# Patient Record
Sex: MALE | Race: WHITE | NOT HISPANIC OR LATINO | Employment: FULL TIME | URBAN - METROPOLITAN AREA
[De-identification: names, ages, dates, MRNs, and addresses within clinical notes are randomized per-mention and may not be internally consistent; named-entity substitution may affect disease eponyms.]

---

## 2018-05-04 DIAGNOSIS — Z01.810 PRE-OPERATIVE CARDIOVASCULAR EXAMINATION: ICD-10-CM

## 2018-05-04 DIAGNOSIS — Z01.812 PRE-OPERATIVE LABORATORY EXAMINATION: ICD-10-CM

## 2018-05-04 LAB
ANION GAP SERPL CALC-SCNC: 6 MMOL/L (ref 0–11.9)
BUN SERPL-MCNC: 8 MG/DL (ref 8–22)
CALCIUM SERPL-MCNC: 9.1 MG/DL (ref 8.5–10.5)
CHLORIDE SERPL-SCNC: 108 MMOL/L (ref 96–112)
CO2 SERPL-SCNC: 26 MMOL/L (ref 20–33)
CREAT SERPL-MCNC: 0.93 MG/DL (ref 0.5–1.4)
EKG IMPRESSION: NORMAL
ERYTHROCYTE [DISTWIDTH] IN BLOOD BY AUTOMATED COUNT: 47.5 FL (ref 35.9–50)
GLUCOSE SERPL-MCNC: 106 MG/DL (ref 65–99)
HCT VFR BLD AUTO: 42.5 % (ref 42–52)
HGB BLD-MCNC: 14.4 G/DL (ref 14–18)
MCH RBC QN AUTO: 31 PG (ref 27–33)
MCHC RBC AUTO-ENTMCNC: 33.9 G/DL (ref 33.7–35.3)
MCV RBC AUTO: 91.6 FL (ref 81.4–97.8)
PLATELET # BLD AUTO: 260 K/UL (ref 164–446)
PMV BLD AUTO: 11.7 FL (ref 9–12.9)
POTASSIUM SERPL-SCNC: 4 MMOL/L (ref 3.6–5.5)
RBC # BLD AUTO: 4.64 M/UL (ref 4.7–6.1)
SODIUM SERPL-SCNC: 140 MMOL/L (ref 135–145)
WBC # BLD AUTO: 6.4 K/UL (ref 4.8–10.8)

## 2018-05-04 PROCEDURE — 36415 COLL VENOUS BLD VENIPUNCTURE: CPT

## 2018-05-04 PROCEDURE — 80048 BASIC METABOLIC PNL TOTAL CA: CPT

## 2018-05-04 PROCEDURE — 85027 COMPLETE CBC AUTOMATED: CPT

## 2018-05-04 PROCEDURE — 93010 ELECTROCARDIOGRAM REPORT: CPT | Performed by: INTERNAL MEDICINE

## 2018-05-04 PROCEDURE — 93005 ELECTROCARDIOGRAM TRACING: CPT

## 2018-05-04 RX ORDER — CHLORAL HYDRATE 500 MG
2000 CAPSULE ORAL DAILY
COMMUNITY
End: 2023-10-16

## 2018-05-04 RX ORDER — LISINOPRIL 20 MG/1
20 TABLET ORAL DAILY
COMMUNITY
End: 2022-12-15 | Stop reason: SDUPTHER

## 2018-05-04 RX ORDER — ASCORBIC ACID 500 MG
500 TABLET ORAL
COMMUNITY

## 2018-05-04 RX ORDER — AMLODIPINE BESYLATE 2.5 MG/1
2.5 TABLET ORAL DAILY
COMMUNITY
End: 2022-12-15 | Stop reason: SDUPTHER

## 2018-05-04 RX ORDER — PNV NO.95/FERROUS FUM/FOLIC AC 28MG-0.8MG
1 TABLET ORAL DAILY
COMMUNITY

## 2018-05-15 ENCOUNTER — HOSPITAL ENCOUNTER (OUTPATIENT)
Facility: MEDICAL CENTER | Age: 53
End: 2018-05-15
Attending: SURGERY | Admitting: SURGERY
Payer: COMMERCIAL

## 2018-05-15 VITALS
TEMPERATURE: 98.7 F | OXYGEN SATURATION: 96 % | DIASTOLIC BLOOD PRESSURE: 82 MMHG | WEIGHT: 167.55 LBS | HEIGHT: 63 IN | RESPIRATION RATE: 16 BRPM | SYSTOLIC BLOOD PRESSURE: 119 MMHG | HEART RATE: 82 BPM | BODY MASS INDEX: 29.69 KG/M2

## 2018-05-15 DIAGNOSIS — G89.18 POSTOPERATIVE PAIN: ICD-10-CM

## 2018-05-15 DIAGNOSIS — K64.8 HEMORRHOID PROLAPSE: Chronic | ICD-10-CM

## 2018-05-15 PROCEDURE — A9270 NON-COVERED ITEM OR SERVICE: HCPCS

## 2018-05-15 PROCEDURE — 160035 HCHG PACU - 1ST 60 MINS PHASE I: Performed by: SURGERY

## 2018-05-15 PROCEDURE — 500128 HCHG BOVIE, NEEDLE TIP 3CM: Performed by: SURGERY

## 2018-05-15 PROCEDURE — 700111 HCHG RX REV CODE 636 W/ 250 OVERRIDE (IP)

## 2018-05-15 PROCEDURE — 160048 HCHG OR STATISTICAL LEVEL 1-5: Performed by: SURGERY

## 2018-05-15 PROCEDURE — 700101 HCHG RX REV CODE 250

## 2018-05-15 PROCEDURE — 160028 HCHG SURGERY MINUTES - 1ST 30 MINS LEVEL 3: Performed by: SURGERY

## 2018-05-15 PROCEDURE — 160002 HCHG RECOVERY MINUTES (STAT): Performed by: SURGERY

## 2018-05-15 PROCEDURE — 160046 HCHG PACU - 1ST 60 MINS PHASE II: Performed by: SURGERY

## 2018-05-15 PROCEDURE — 700102 HCHG RX REV CODE 250 W/ 637 OVERRIDE(OP)

## 2018-05-15 PROCEDURE — 160047 HCHG PACU  - EA ADDL 30 MINS PHASE II: Performed by: SURGERY

## 2018-05-15 PROCEDURE — 501838 HCHG SUTURE GENERAL: Performed by: SURGERY

## 2018-05-15 PROCEDURE — A6403 STERILE GAUZE>16 <= 48 SQ IN: HCPCS | Performed by: SURGERY

## 2018-05-15 PROCEDURE — 160025 RECOVERY II MINUTES (STATS): Performed by: SURGERY

## 2018-05-15 PROCEDURE — 160039 HCHG SURGERY MINUTES - EA ADDL 1 MIN LEVEL 3: Performed by: SURGERY

## 2018-05-15 PROCEDURE — 160009 HCHG ANES TIME/MIN: Performed by: SURGERY

## 2018-05-15 PROCEDURE — 88304 TISSUE EXAM BY PATHOLOGIST: CPT | Mod: 59

## 2018-05-15 RX ORDER — BUPIVACAINE HYDROCHLORIDE AND EPINEPHRINE 5; 5 MG/ML; UG/ML
INJECTION, SOLUTION EPIDURAL; INTRACAUDAL; PERINEURAL
Status: DISCONTINUED | OUTPATIENT
Start: 2018-05-15 | End: 2018-05-15 | Stop reason: HOSPADM

## 2018-05-15 RX ORDER — OXYCODONE HCL 5 MG/5 ML
SOLUTION, ORAL ORAL
Status: COMPLETED
Start: 2018-05-15 | End: 2018-05-15

## 2018-05-15 RX ORDER — LIDOCAINE HYDROCHLORIDE 10 MG/ML
0.5 INJECTION, SOLUTION INFILTRATION; PERINEURAL
Status: DISCONTINUED | OUTPATIENT
Start: 2018-05-15 | End: 2018-05-15 | Stop reason: HOSPADM

## 2018-05-15 RX ORDER — OXYCODONE HYDROCHLORIDE AND ACETAMINOPHEN 5; 325 MG/1; MG/1
1-2 TABLET ORAL EVERY 4 HOURS PRN
Qty: 20 TAB | Refills: 0 | Status: SHIPPED | OUTPATIENT
Start: 2018-05-15 | End: 2018-05-15

## 2018-05-15 RX ORDER — CA/D3/MAG OX/ZINC/COP/MANG/BOR 600 MG-800
1 TABLET,CHEWABLE ORAL DAILY
COMMUNITY

## 2018-05-15 RX ORDER — MIDAZOLAM HYDROCHLORIDE 1 MG/ML
INJECTION INTRAMUSCULAR; INTRAVENOUS
Status: DISCONTINUED
Start: 2018-05-15 | End: 2018-05-15 | Stop reason: HOSPADM

## 2018-05-15 RX ORDER — SODIUM CHLORIDE, SODIUM LACTATE, POTASSIUM CHLORIDE, CALCIUM CHLORIDE 600; 310; 30; 20 MG/100ML; MG/100ML; MG/100ML; MG/100ML
1000 INJECTION, SOLUTION INTRAVENOUS
Status: COMPLETED | OUTPATIENT
Start: 2018-05-15 | End: 2018-05-15

## 2018-05-15 RX ORDER — OXYCODONE HYDROCHLORIDE AND ACETAMINOPHEN 5; 325 MG/1; MG/1
1-2 TABLET ORAL EVERY 4 HOURS PRN
Qty: 20 TAB | Refills: 0 | Status: SHIPPED | OUTPATIENT
Start: 2018-05-15 | End: 2018-05-20

## 2018-05-15 RX ADMIN — OXYCODONE HYDROCHLORIDE 5 MG: 5 SOLUTION ORAL at 09:08

## 2018-05-15 RX ADMIN — SODIUM CHLORIDE, SODIUM LACTATE, POTASSIUM CHLORIDE, CALCIUM CHLORIDE 1000 ML: 600; 310; 30; 20 INJECTION, SOLUTION INTRAVENOUS at 07:10

## 2018-05-15 ASSESSMENT — PAIN SCALES - GENERAL
PAINLEVEL_OUTOF10: 4
PAINLEVEL_OUTOF10: 2
PAINLEVEL_OUTOF10: 2

## 2018-05-15 NOTE — OR SURGEON
Immediate Post OP Note    PreOp Diagnosis:   Grade 4 Hemorrhoids  PostOp Diagnosis:     Procedure(s):  HEMORRHOIDECTOMY - Wound Class: Contaminated    Surgeon(s):  Nino Keane M.D.    Anesthesiologist/Type of Anesthesia:  Anesthesiologist: Darlene Ponce M.D./General    Surgical Staff:  Circulator: Elisabeth Chinchilla R.N.  Scrub Person: Arlyn Santoyo    Specimens removed if any:  Left lateral  Right Anterior    Estimated Blood Loss: 30mL    Findings: Prolapsed internal left lateral hemorrhoid unable to be reduced. Excisional hemorrhoidectomy performed for left lateral and right anterior hemorrhoidal complexes.    Complications: none        5/15/2018 9:01 AM Nino Keane M.D.

## 2018-05-15 NOTE — OR NURSING
Discharged home able to urinate and take po.  Walked to [facility] with rn to  and fill script for home.  verbalzied understanding of dc instructions.

## 2018-05-15 NOTE — DISCHARGE INSTRUCTIONS
ACTIVITY: Rest and take it easy for the first 24 hours.  A responsible adult is recommended to remain with you during that time.  It is normal to feel sleepy.  We encourage you to not do anything that requires balance, judgment or coordination.    MILD FLU-LIKE SYMPTOMS ARE NORMAL. YOU MAY EXPERIENCE GENERALIZED MUSCLE ACHES, THROAT IRRITATION, HEADACHE AND/OR SOME NAUSEA.    FOR 24 HOURS DO NOT:  Drive, operate machinery or run household appliances.  Drink beer or alcoholic beverages.   Make important decisions or sign legal documents.    SPECIAL INSTRUCTIONS: *  1.  Carefully remove bandage on the day after surgery. You may shower. Good hygiene is essential for proper healing.  Wearing soft gauze pads or sanitary napkins in your underwear helps to control fluid drainage, discharge of mucous, and bleeding. Change pads and underwear frequently.   2. There are no specific dietary restrictions associated with this surgery. Avoid foods which make you constipated. Be sure to include wheat bran, fresh fruits and plenty of vegetables in your diet. Drink plenty of water.   3. Avoid straining, especially a few days after surgery, when you may experience swelling that feels like an incompletely passed stool or a hemorrhoid. Milk of magnesia should be taken every day starting day of surgery until your first bowel movement. Additionally, we advise that you use an over-the-counter stool softener, such as Colace. Please call our office if no bowel movement within 72 hours.   4. Moderate exercise is healthy. You may walk as much as you like. You are permitted to go up and down stairs slowly while using a handrail. Resume normal activities after first postoperative clinic visit. Avoid heavy lifting or strenuous activity until that time.   5. You may drive when you are comfortable enough to react and move quickly in an emergency (usually 1-2 weeks after surgery). Long trips over 25 miles are not advised. Do not drive if you are  taking prescription pain medication.   6. Return to work when you feel you are able o work with the restrictions as noted above.   7. For pain, one of the Ibuprofen compounds (Advil, Nuprin, etc.) or Tylenol is suggested. Should these not be effective in managing your discomfort, prescription pain medications should be used sparingly to avoid constipation.    8. Please call the office to report any of the following:   Temperature of 101 or higher.   Signs of infection, possibly including increasing redness, warmth,   tenderness, drainage, odor at incision site   Persistent moderate to severe pain   No bowel movement within 72 hours from OR date.      DIET: To avoid nausea, slowly advance diet as tolerated, avoiding spicy or greasy foods for the first day.  Add more substantial food to your diet according to your physician's instructions.  Babies can be fed formula or breast milk as soon as they are hungry.  INCREASE FLUIDS AND FIBER TO AVOID CONSTIPATION.      FOLLOW-UP APPOINTMENT:  A follow-up appointment should be arranged with your doctor ; call to schedule.    You should CALL YOUR PHYSICIAN if you develop:  Fever greater than 101 degrees F.  Pain not relieved by medication, or persistent nausea or vomiting.  Excessive bleeding (blood soaking through dressing) or unexpected drainage from the wound.  Extreme redness or swelling around the incision site, drainage of pus or foul smelling drainage.  Inability to urinate or empty your bladder within 8 hours.  Problems with breathing or chest pain.    You should call 911 if you develop problems with breathing or chest pain.  If you are unable to contact your doctor or surgical center, you should go to the nearest emergency room or urgent care center.  Physician's telephone #: ***    If any questions arise, call your doctor.  If your doctor is not available, please feel free to call the Surgical Center at {Surgical Dept Numbers:[id number]}.  The Center is open Monday  through Friday from 7AM to 7PM.  You can also call the HEALTH HOTLINE open 24 hours/day, 7 days/week and speak to a nurse at [phone], or toll free at [phone].    A registered nurse may call you a few days after your surgery to see how you are doing after your procedure.    MEDICATIONS: Resume taking daily medication.  Take prescribed pain medication with food.  If no medication is prescribed, you may take non-aspirin pain medication if needed.  PAIN MEDICATION CAN BE VERY CONSTIPATING.  Take a stool softener or laxative such as senokot, pericolace, or milk of magnesia if needed.    Prescription given for *percocet**.  Last pain medication given at *908 am**.    If your physician has prescribed pain medication that includes Acetaminophen (Tylenol), do not take additional Acetaminophen (Tylenol) while taking the prescribed medication.    Depression / Suicide Risk    As you are discharged from this [facility] facility, it is important to learn how to keep safe from harming yourself.    Recognize the warning signs:  · Abrupt changes in personality, positive or negative- including increase in energy   · Giving away possessions  · Change in eating patterns- significant weight changes-  positive or negative  · Change in sleeping patterns- unable to sleep or sleeping all the time   · Unwillingness or inability to communicate  · Depression  · Unusual sadness, discouragement and loneliness  · Talk of wanting to die  · Neglect of personal appearance   · Rebelliousness- reckless behavior  · Withdrawal from people/activities they love  · Confusion- inability to concentrate     If you or a loved one observes any of these behaviors or has concerns about self-harm, here's what you can do:  · Talk about it- your feelings and reasons for harming yourself  · Remove any means that you might use to hurt yourself (examples: pills, rope, extension cords, firearm)  · Get professional help from the community (Mental Health,  Substance Abuse, psychological counseling)  · Do not be alone:Call your Safe Contact- someone whom you trust who will be there for you.  · Call your local CRISIS HOTLINE 317-7633 or 208-041-6460  · Call your local Children's Mobile Crisis Response Team [place] (619) 564-4740 or www.WiFast  · Call the toll free National Suicide Prevention Hotlines   · National Suicide Prevention Lifeline 977-716-TZVL (9311)  · National Hope Line Network 800-SUICIDE (805-7001)

## 2018-05-15 NOTE — OP REPORT
DATE OF OPERATION: 5/15/2018    SURGEON:  Nino Keane MD PhD    PREOPERATIVE DIAGNOSIS:  Rectal bleeding rectal pain prolapsed hemorrhoids  POSTOPERATIVE DIAGNOSIS: Prolapsed  Internal/external bleeding hemorrhoids  SURGERY: exam under anesthesia with excisional hemorrhoidectomy ×2  OPERATIVE FINDINGS:  grade 3/4 internal hemorrhoids with stigmata of recent bleed  INDICATIONS FOR PROCEDURE:  This 52 y.o. Male with a history of symptomatic hemorrhoids refractory to medical management.  He possible risks and complications of treatment were described in detail to the patient including infection, bleeding, trouble urinating and very rarely narrowing in the anal canal.  The patient expresses understanding of the risks and benefits of the proposed procedure and wished to proceed.    PROCEDURE IN BRIEF:    The patient was brought to the operating room placed under general anesthesia with bilateral SCDs in place. The patient was then moved into the prone jackknife position.  a timeout was completed verifying the correct patient and procedure site positioning and availability of equipment prior to the start of surgery.  The buttocks were taped apart and the perineum was prepped and draped in the usual standard sterile fashion.  Half percent Marcaine with epinephrine was injected as a perianal nerve block. The anus was carefully dilated until 3 fingers could be introduced.  An anoscope was introduced and the 3 hemorrhoidal pedicles were identified. Both pedicles were excised and turned in the following fashion.  An elliptical incision extending from the perianal skin to the anorectal ring including both internal and external hemorrhoids and excising a minimum amount of anoderm.  The dilated venous mass was then dissected using Metzenbaum scissors from the underlying sphincter muscle. LigaSure device was then used to divide thevascular pedicle.  Hemostasis was achieved.  All hemostasis the skin and mucosal incisions were  closed with a running lock suture of 2-0 Vicryl on the mucosal aspect and converted to subcuticular once the skin was encountered. The anal canal was then injected with local anesthetic.  A gauze pad was tucked between the gluteal folds. The patient tolerated the procedure well and was extubated and taken to the postanesthesia care unit in stable condition.       ____________________________________     JAY BEATTY MD

## 2022-11-21 ENCOUNTER — TELEPHONE (OUTPATIENT)
Dept: SCHEDULING | Facility: IMAGING CENTER | Age: 57
End: 2022-11-21

## 2022-12-15 ENCOUNTER — OFFICE VISIT (OUTPATIENT)
Dept: MEDICAL GROUP | Facility: PHYSICIAN GROUP | Age: 57
End: 2022-12-15
Payer: COMMERCIAL

## 2022-12-15 VITALS
BODY MASS INDEX: 29.73 KG/M2 | HEART RATE: 73 BPM | HEIGHT: 63 IN | RESPIRATION RATE: 16 BRPM | WEIGHT: 167.8 LBS | OXYGEN SATURATION: 96 % | SYSTOLIC BLOOD PRESSURE: 132 MMHG | DIASTOLIC BLOOD PRESSURE: 70 MMHG | TEMPERATURE: 98.7 F

## 2022-12-15 DIAGNOSIS — Z00.00 HEALTHCARE MAINTENANCE: ICD-10-CM

## 2022-12-15 DIAGNOSIS — R00.2 PALPITATION: ICD-10-CM

## 2022-12-15 DIAGNOSIS — Z11.59 ENCOUNTER FOR HEPATITIS C SCREENING TEST FOR LOW RISK PATIENT: ICD-10-CM

## 2022-12-15 DIAGNOSIS — D64.89 ANEMIA DUE TO OTHER CAUSE, NOT CLASSIFIED: ICD-10-CM

## 2022-12-15 DIAGNOSIS — Z12.11 COLON CANCER SCREENING: ICD-10-CM

## 2022-12-15 DIAGNOSIS — I10 ESSENTIAL HYPERTENSION: ICD-10-CM

## 2022-12-15 DIAGNOSIS — Z87.19 HX OF HEMORRHOIDS: ICD-10-CM

## 2022-12-15 PROBLEM — Z82.0 FHX: ALZHEIMER'S DISEASE: Status: ACTIVE | Noted: 2022-12-15

## 2022-12-15 PROCEDURE — 99204 OFFICE O/P NEW MOD 45 MIN: CPT | Performed by: STUDENT IN AN ORGANIZED HEALTH CARE EDUCATION/TRAINING PROGRAM

## 2022-12-15 RX ORDER — LISINOPRIL 20 MG/1
20 TABLET ORAL DAILY
Qty: 90 TABLET | Refills: 1 | Status: SHIPPED | OUTPATIENT
Start: 2022-12-15 | End: 2023-05-02 | Stop reason: SDUPTHER

## 2022-12-15 RX ORDER — AMLODIPINE BESYLATE 2.5 MG/1
2.5 TABLET ORAL DAILY
Qty: 90 TABLET | Refills: 1 | Status: SHIPPED | OUTPATIENT
Start: 2022-12-15 | End: 2023-05-02 | Stop reason: SDUPTHER

## 2022-12-15 ASSESSMENT — PATIENT HEALTH QUESTIONNAIRE - PHQ9: CLINICAL INTERPRETATION OF PHQ2 SCORE: 0

## 2022-12-15 NOTE — PROGRESS NOTES
Subjective:     CC:  Diagnoses of Essential hypertension, Hx of hemorrhoids, Anemia due to other cause, not classified, Encounter for hepatitis C screening test for low risk patient, Healthcare maintenance, Colon cancer screening, and Palpitation were pertinent to this visit.    HISTORY OF THE PRESENT ILLNESS: Patient is a 57 y.o. male.  This patient is new to me today.  Previously patient of Corey Rivers MD.  This pleasant patient is here today to discuss:    1. Essential hypertension  Amlodipine 2.5 mg daily.  Lisinopril 20 mg daily.  Patient is fully compliant with these medications.  He did not want to have him take a diuretic for maintenance of blood pressure but has not done so in many months.  He does have a blood pressure log going back to September that shows no elevated blood pressure readings.    2. Hx of hemorrhoids  3. Anemia due to other cause, not classified  Patient has a history of hemorrhoids with chronic bleeding that caused anemia.  After surgical resolution of his hemorrhoids his anemia resolved.  He continues to take an iron supplement once per week as he feels that this reduces restless leg type symptoms.  He does take the iron supplement on an empty stomach with a vitamin C supplement.    4. Encounter for hepatitis C screening test for low risk patient  Not at increased risk.    5. Healthcare maintenance  Limited exercise, good diet, he has regular dentistry.    6. Colon cancer screening  Patient is due for colonoscopy.  Previously on a 5-year schedule.    7. Need for vaccination  Patient has his vaccine records and will return these when he comes back for lab review.    8. Palpitation  Several instances of palpitations over the past summer while engaged in physical activity including walking around [facility] and cycling.  He does not have these at rest or during activities of daily living.  No chest pain/pressure.  No concurrent shortness of breath reported    Active Diagnosis:    Patient  "Active Problem List   Diagnosis    Hemorrhoid prolapse    Essential hypertension    Hx of hemorrhoids    Other specified anemias    FHx: Alzheimer's disease    Palpitation      Current Outpatient Medications Ordered in Epic   Medication Sig Dispense Refill    amLODIPine (NORVASC) 2.5 MG Tab Take 1 Tablet by mouth every day. 90 Tablet 1    lisinopril (PRINIVIL) 20 MG Tab Take 1 Tablet by mouth every day. 90 Tablet 1    Probiotic Product (PROBIOTIC ADVANCED) Cap Take 1 Cap by mouth every day.      Omega-3 Fatty Acids (FISH OIL) 1000 MG Cap capsule Take 2,000 mg by mouth every day.      Ferrous Sulfate (IRON) 325 (65 Fe) MG Tab Take 1 Tab by mouth every day.      ascorbic acid (ASCORBIC ACID) 500 MG Tab Take 500 mg by mouth every day. (Patient not taking: Reported on 12/15/2022)       No current [facility]-ordered facility-administered medications on file.     ROS:   Gen: No fevers/chills, no changes in weight  HEENT: No changes in vision/hearing, sore throat.  Pulm: No cough, unexplained SOB.  CV: No chest pain/pressure, no palpitations  GI: No nausea/vomiting, no diarrhea  : No dysuria/nocturia  MSk: No myalgias  Skin: No rash/skin changes  Neuro: No headaches, no numbness/tingling  Heme/Lymph: no easy bruising      Objective:     Exam: /70 (BP Location: Left arm, Patient Position: Sitting, BP Cuff Size: Adult)   Pulse 73   Temp 37.1 °C (98.7 °F) (Temporal)   Resp 16   Ht 1.6 m (5' 3\")   Wt 76.1 kg (167 lb 12.8 oz)   SpO2 96%  Body mass index is 29.72 kg/m².    General: Normal appearing. No distress.  HEENT: Normocephalic. Eyes conjunctiva clear lids without ptosis. Pupils equal and reactive to light accommodation. Ears normal shape and contour.  Right external auditory canal is clear.  Left external auditory canal shows multiple polyps at the 5 and 12 o'clock position.  These are small and do not occlude the tympanic membrane.Tympanic membranes are benign. Nasal mucosa benign, oropharynx is without erythema, " edema or exudates.   Neck: Supple without JVD. Thyroid is not enlarged.  Pulmonary: Clear to ausculation.  Normal effort. No rales, ronchi, or wheezing.  Cardiovascular: Regular rate and rhythm without murmur. Radial pulses are intact and equal bilaterally.  Abdomen: Nondistended   neurologic: Grossly nonfocal.  CN II through XII intact.  Lymph: No cervical or supraclavicular lymph nodes are palpable  Skin: Warm and dry.  No obvious lesions.  Musculoskeletal: Normal gait. No extremity cyanosis, clubbing, or edema.  Psych: Normal mood and affect. Alert and oriented x3. Judgment and insight are normal.    A chaperone was offered to the patient during today's exam. Patient declined chaperone.    Labs:   No recent labs.    Assessment & Plan:   57 y.o. male with the following -    1. Essential hypertension  -Chronic, stable.  Well treated without side effect.  - amLODIPine (NORVASC) 2.5 MG Tab; Take 1 Tablet by mouth every day.  Dispense: 90 Tablet; Refill: 1  - lisinopril (PRINIVIL) 20 MG Tab; Take 1 Tablet by mouth every day.  Dispense: 90 Tablet; Refill: 1    2. Hx of hemorrhoids  -Currently in resolution.    3. Anemia due to other cause, not classified  - Believed to be in resolution.  - CBC WITHOUT DIFFERENTIAL; Future  - IRON/TOTAL IRON BIND; Future  - FERRITIN; Future    4. Encounter for hepatitis C screening test for low risk patient  - HCV Scrn ( [id number] 1xLife); Future    5. Healthcare maintenance  - CBC WITHOUT DIFFERENTIAL; Future  - Comp Metabolic Panel; Future  - Lipid Profile; Future  - VITAMIN D,25 HYDROXY (DEFICIENCY); Future  - IRON/TOTAL IRON BIND; Future  - FERRITIN; Future    6. Colon cancer screening  - Referral to GI for Colonoscopy    7. Need for vaccination  -Patient will provide vaccine records at his next visit for assessment of vaccine needs.    8. Palpitation  -Undiagnosed new problem with uncertain prognosis.  -Patient is currently asymptomatic.  - Cardiac Stress Test Treadmill Only;  Future    Return in about 6 weeks (around 1/26/2023).  For lab follow-up, patient preference and #8 above.    Please note that this dictation was created using voice recognition software. I have made every reasonable attempt to correct obvious errors, but I expect that there are errors of grammar and possibly content that I did not discover before finalizing the note.      Nino Ryder PA-C 12/15/2022

## 2022-12-20 ENCOUNTER — HOSPITAL ENCOUNTER (OUTPATIENT)
Dept: LAB | Facility: MEDICAL CENTER | Age: 57
End: 2022-12-20
Attending: STUDENT IN AN ORGANIZED HEALTH CARE EDUCATION/TRAINING PROGRAM
Payer: COMMERCIAL

## 2022-12-20 DIAGNOSIS — Z11.59 ENCOUNTER FOR HEPATITIS C SCREENING TEST FOR LOW RISK PATIENT: ICD-10-CM

## 2022-12-20 DIAGNOSIS — Z00.00 HEALTHCARE MAINTENANCE: ICD-10-CM

## 2022-12-20 DIAGNOSIS — D64.89 ANEMIA DUE TO OTHER CAUSE, NOT CLASSIFIED: ICD-10-CM

## 2022-12-20 LAB
25(OH)D3 SERPL-MCNC: 23 NG/ML (ref 30–100)
ALBUMIN SERPL BCP-MCNC: 4.1 G/DL (ref 3.2–4.9)
ALBUMIN/GLOB SERPL: 1.5 G/DL
ALP SERPL-CCNC: 69 U/L (ref 30–99)
ALT SERPL-CCNC: 22 U/L (ref 2–50)
ANION GAP SERPL CALC-SCNC: 11 MMOL/L (ref 7–16)
AST SERPL-CCNC: 16 U/L (ref 12–45)
BILIRUB SERPL-MCNC: 0.5 MG/DL (ref 0.1–1.5)
BUN SERPL-MCNC: 13 MG/DL (ref 8–22)
CALCIUM ALBUM COR SERPL-MCNC: 9 MG/DL (ref 8.5–10.5)
CALCIUM SERPL-MCNC: 9.1 MG/DL (ref 8.5–10.5)
CHLORIDE SERPL-SCNC: 105 MMOL/L (ref 96–112)
CHOLEST SERPL-MCNC: 213 MG/DL (ref 100–199)
CO2 SERPL-SCNC: 24 MMOL/L (ref 20–33)
CREAT SERPL-MCNC: 0.9 MG/DL (ref 0.5–1.4)
ERYTHROCYTE [DISTWIDTH] IN BLOOD BY AUTOMATED COUNT: 41.1 FL (ref 35.9–50)
FASTING STATUS PATIENT QL REPORTED: NORMAL
FERRITIN SERPL-MCNC: 313 NG/ML (ref 22–322)
GFR SERPLBLD CREATININE-BSD FMLA CKD-EPI: 99 ML/MIN/1.73 M 2
GLOBULIN SER CALC-MCNC: 2.7 G/DL (ref 1.9–3.5)
GLUCOSE SERPL-MCNC: 116 MG/DL (ref 65–99)
HCT VFR BLD AUTO: 46.3 % (ref 42–52)
HCV AB SER QL: NORMAL
HDLC SERPL-MCNC: 34 MG/DL
HGB BLD-MCNC: 17 G/DL (ref 14–18)
IRON SATN MFR SERPL: 32 % (ref 15–55)
IRON SERPL-MCNC: 86 UG/DL (ref 50–180)
LDLC SERPL CALC-MCNC: 124 MG/DL
MCH RBC QN AUTO: 31.5 PG (ref 27–33)
MCHC RBC AUTO-ENTMCNC: 36.7 G/DL (ref 33.7–35.3)
MCV RBC AUTO: 85.9 FL (ref 81.4–97.8)
PLATELET # BLD AUTO: 227 K/UL (ref 164–446)
PMV BLD AUTO: 11.6 FL (ref 9–12.9)
POTASSIUM SERPL-SCNC: 3.6 MMOL/L (ref 3.6–5.5)
PROT SERPL-MCNC: 6.8 G/DL (ref 6–8.2)
RBC # BLD AUTO: 5.39 M/UL (ref 4.7–6.1)
SODIUM SERPL-SCNC: 140 MMOL/L (ref 135–145)
TIBC SERPL-MCNC: 271 UG/DL (ref 250–450)
TRIGL SERPL-MCNC: 273 MG/DL (ref 0–149)
UIBC SERPL-MCNC: 185 UG/DL (ref 110–370)
WBC # BLD AUTO: 8.5 K/UL (ref 4.8–10.8)

## 2022-12-20 PROCEDURE — 80061 LIPID PANEL: CPT

## 2022-12-20 PROCEDURE — 82728 ASSAY OF FERRITIN: CPT

## 2022-12-20 PROCEDURE — 80053 COMPREHEN METABOLIC PANEL: CPT

## 2022-12-20 PROCEDURE — 36415 COLL VENOUS BLD VENIPUNCTURE: CPT

## 2022-12-20 PROCEDURE — G0472 HEP C SCREEN HIGH RISK/OTHER: HCPCS

## 2022-12-20 PROCEDURE — 83550 IRON BINDING TEST: CPT

## 2022-12-20 PROCEDURE — 85027 COMPLETE CBC AUTOMATED: CPT

## 2022-12-20 PROCEDURE — 83540 ASSAY OF IRON: CPT

## 2022-12-20 PROCEDURE — 82306 VITAMIN D 25 HYDROXY: CPT

## 2023-01-06 ENCOUNTER — OFFICE VISIT (OUTPATIENT)
Dept: MEDICAL GROUP | Facility: PHYSICIAN GROUP | Age: 58
End: 2023-01-06
Payer: COMMERCIAL

## 2023-01-06 VITALS
TEMPERATURE: 97.6 F | OXYGEN SATURATION: 97 % | WEIGHT: 168 LBS | BODY MASS INDEX: 29.77 KG/M2 | HEIGHT: 63 IN | DIASTOLIC BLOOD PRESSURE: 60 MMHG | HEART RATE: 75 BPM | SYSTOLIC BLOOD PRESSURE: 128 MMHG

## 2023-01-06 DIAGNOSIS — Z23 NEED FOR VACCINATION: ICD-10-CM

## 2023-01-06 DIAGNOSIS — R73.03 PREDIABETES: ICD-10-CM

## 2023-01-06 DIAGNOSIS — E78.00 PURE HYPERCHOLESTEROLEMIA: ICD-10-CM

## 2023-01-06 PROCEDURE — 90715 TDAP VACCINE 7 YRS/> IM: CPT | Performed by: STUDENT IN AN ORGANIZED HEALTH CARE EDUCATION/TRAINING PROGRAM

## 2023-01-06 PROCEDURE — 99214 OFFICE O/P EST MOD 30 MIN: CPT | Mod: 25 | Performed by: STUDENT IN AN ORGANIZED HEALTH CARE EDUCATION/TRAINING PROGRAM

## 2023-01-06 PROCEDURE — 90471 IMMUNIZATION ADMIN: CPT | Performed by: STUDENT IN AN ORGANIZED HEALTH CARE EDUCATION/TRAINING PROGRAM

## 2023-01-06 RX ORDER — ROSUVASTATIN CALCIUM 10 MG/1
10 TABLET, COATED ORAL EVERY EVENING
Qty: 30 TABLET | Refills: 11 | Status: SHIPPED | OUTPATIENT
Start: 2023-01-06 | End: 2023-05-02 | Stop reason: SDUPTHER

## 2023-01-06 ASSESSMENT — PATIENT HEALTH QUESTIONNAIRE - PHQ9: CLINICAL INTERPRETATION OF PHQ2 SCORE: 0

## 2023-01-06 ASSESSMENT — FIBROSIS 4 INDEX: FIB4 SCORE: 0.87

## 2023-01-06 NOTE — PROGRESS NOTES
Subjective:     CC:  Diagnoses of Need for vaccination, Pure hypercholesterolemia, and Prediabetes were pertinent to this visit.    HISTORY OF THE PRESENT ILLNESS: Patient is a 57 y.o. male. This pleasant patient is here today to discuss:    1. Need for vaccination  Patient desires his Tdap and shingles vaccine.  He declines his influenza vaccine.    2. Pure hypercholesterolemia  Patient does he did diet with some increased saturated fat, he particularly favors cheese.    3.  Prediabetes  Patient does moderate his carbohydrate intake.  He says that he feels kind of poor if he goes to a very low carbohydrate diet.  He has been unsuccessful in weight loss.  He previously exercised quite vigorously with cycling but has been abstaining from that for several months now.  He is open-minded about getting back into cycling.  He does regularly walk for fairly long distances.    Active Diagnosis:    Patient Active Problem List   Diagnosis    Hemorrhoid prolapse    Essential hypertension    Hx of hemorrhoids    Other specified anemias    FHx: Alzheimer's disease    Palpitation    Pure hypercholesterolemia    Prediabetes      Current Outpatient Medications Ordered in Epic   Medication Sig Dispense Refill    Zoster Vac Recomb Adjuvanted (SHINGRIX) 50 MCG/0.5ML Recon Susp Inject 0.5 mL into the shoulder, thigh, or buttocks one time for 1 dose. 0.5 mL 0    rosuvastatin (CRESTOR) 10 MG Tab Take 1 Tablet by mouth every evening. 30 Tablet 11    amLODIPine (NORVASC) 2.5 MG Tab Take 1 Tablet by mouth every day. 90 Tablet 1    lisinopril (PRINIVIL) 20 MG Tab Take 1 Tablet by mouth every day. 90 Tablet 1    Probiotic Product (PROBIOTIC ADVANCED) Cap Take 1 Cap by mouth every day.      Omega-3 Fatty Acids (FISH OIL) 1000 MG Cap capsule Take 2,000 mg by mouth every day.      Ferrous Sulfate (IRON) 325 (65 Fe) MG Tab Take 1 Tab by mouth every day.      ascorbic acid (ASCORBIC ACID) 500 MG Tab Take 500 mg by mouth every 7 days. Indications:  "with iron       No current Epic-ordered facility-administered medications on file.     ROS:   Gen: No fevers/chills, no changes in weight  See HPI.    Objective:     Exam: /60 (BP Location: Left arm, Patient Position: Sitting, BP Cuff Size: Adult)   Pulse 75   Temp 36.4 °C (97.6 °F) (Temporal)   Ht 1.6 m (5' 3\")   Wt 76.2 kg (168 lb)   SpO2 97%  Body mass index is 29.76 kg/m².    General: Normal appearing. No distress.  neurologic: Grossly nonfocal.    Skin: Warm and dry.  No obvious lesions.  Musculoskeletal: Normal gait. No extremity cyanosis, clubbing, or edema.  Psych: Normal mood and affect. Alert and oriented x3. Judgment and insight are normal.    A chaperone was offered to the patient during today's exam. Patient declined chaperone.    Labs:   12/20/2022:  -CBC, WNL  -CMP showing elevated fasting glucose of 116  -Lipid profile showing total cholesterol 213, triglycerides 273, HDL 34, .  ASCVD score 12.8%.    Assessment & Plan:   57 y.o. male with the following -    1. Need for vaccination  - Zoster Vac Recomb Adjuvanted (SHINGRIX) 50 MCG/0.5ML Recon Susp; Inject 0.5 mL into the shoulder, thigh, or buttocks one time for 1 dose.  Dispense: 0.5 mL; Refill: 0  - Tdap Vaccine =>6YO IM    2. Pure hypercholesterolemia  -Chronic, untreated.  - rosuvastatin (CRESTOR) 10 MG Tab; Take 1 Tablet by mouth every evening.  Dispense: 30 Tablet; Refill: 11  - Lipid Profile; in 6 weeks time.    3.  Prediabetes  -Chronic.  -Patient has been encouraged to get back to regular cycling trying to get at least 30 minutes of aerobic activity each day.    Return in about 6 months (around 7/6/2023).  For follow-up on chronic illnesses.    Please note that this dictation was created using voice recognition software. I have made every reasonable attempt to correct obvious errors, but I expect that there are errors of grammar and possibly content that I did not discover before finalizing the note.      Nino Ryder PA-C " 1/6/2023

## 2023-02-17 ENCOUNTER — HOSPITAL ENCOUNTER (OUTPATIENT)
Dept: LAB | Facility: MEDICAL CENTER | Age: 58
End: 2023-02-17
Attending: STUDENT IN AN ORGANIZED HEALTH CARE EDUCATION/TRAINING PROGRAM
Payer: COMMERCIAL

## 2023-02-17 DIAGNOSIS — E78.00 PURE HYPERCHOLESTEROLEMIA: ICD-10-CM

## 2023-02-17 LAB
CHOLEST SERPL-MCNC: 125 MG/DL (ref 100–199)
FASTING STATUS PATIENT QL REPORTED: NORMAL
HDLC SERPL-MCNC: 39 MG/DL
LDLC SERPL CALC-MCNC: 60 MG/DL
TRIGL SERPL-MCNC: 131 MG/DL (ref 0–149)

## 2023-02-17 PROCEDURE — 80061 LIPID PANEL: CPT

## 2023-02-17 PROCEDURE — 36415 COLL VENOUS BLD VENIPUNCTURE: CPT

## 2023-05-02 ENCOUNTER — TELEPHONE (OUTPATIENT)
Dept: MEDICAL GROUP | Facility: PHYSICIAN GROUP | Age: 58
End: 2023-05-02

## 2023-05-02 DIAGNOSIS — I10 ESSENTIAL HYPERTENSION: ICD-10-CM

## 2023-05-02 DIAGNOSIS — E78.00 PURE HYPERCHOLESTEROLEMIA: ICD-10-CM

## 2023-05-02 RX ORDER — AMLODIPINE BESYLATE 2.5 MG/1
2.5 TABLET ORAL DAILY
Qty: 90 TABLET | Refills: 1 | Status: SHIPPED | OUTPATIENT
Start: 2023-05-02 | End: 2023-11-10 | Stop reason: SDUPTHER

## 2023-05-02 RX ORDER — LISINOPRIL 20 MG/1
20 TABLET ORAL DAILY
Qty: 90 TABLET | Refills: 1 | Status: SHIPPED | OUTPATIENT
Start: 2023-05-02 | End: 2023-11-10 | Stop reason: SDUPTHER

## 2023-05-02 RX ORDER — ROSUVASTATIN CALCIUM 10 MG/1
10 TABLET, COATED ORAL EVERY EVENING
Qty: 90 TABLET | Refills: 1 | Status: SHIPPED | OUTPATIENT
Start: 2023-05-02 | End: 2023-11-10 | Stop reason: SDUPTHER

## 2023-05-02 NOTE — TELEPHONE ENCOUNTER
Received request via: Patient    Was the patient seen in the last year in this department? Yes    Does the patient have an active prescription (recently filled or refills available) for medication(s) requested? No    Does the patient have [facility] and need 100 day supply (blood pressure, diabetes and cholesterol meds only)? Patient does not have SCP      Patient was having issues with insurance and needed to change pharmacies.

## 2023-05-02 NOTE — TELEPHONE ENCOUNTER
Patient came in to the office in regards of changing pharmacy due to insurance conflict.. Patient insurance instructed him to have the provider send refills from now on to ( [facility]). But would still like to leave the [facility] pharmacy on file in case that the provider would need to send a prescription for any urgent reasons. Patient would like to request the provider to refill 3 presciptions with 3months supplies for each ( Rosuvastatin, Amlodipine, Lisinopril) Please send these prescription through [facility]..

## 2023-07-31 ENCOUNTER — OFFICE VISIT (OUTPATIENT)
Dept: MEDICAL GROUP | Facility: PHYSICIAN GROUP | Age: 58
End: 2023-07-31
Payer: COMMERCIAL

## 2023-07-31 VITALS
SYSTOLIC BLOOD PRESSURE: 120 MMHG | BODY MASS INDEX: 29.88 KG/M2 | TEMPERATURE: 97.6 F | OXYGEN SATURATION: 95 % | HEART RATE: 71 BPM | HEIGHT: 63 IN | DIASTOLIC BLOOD PRESSURE: 80 MMHG | WEIGHT: 168.6 LBS

## 2023-07-31 DIAGNOSIS — I10 ESSENTIAL HYPERTENSION: ICD-10-CM

## 2023-07-31 DIAGNOSIS — M54.2 NECK AND SHOULDER PAIN: ICD-10-CM

## 2023-07-31 DIAGNOSIS — M25.519 NECK AND SHOULDER PAIN: ICD-10-CM

## 2023-07-31 DIAGNOSIS — Z23 NEED FOR VACCINATION: ICD-10-CM

## 2023-07-31 DIAGNOSIS — R73.03 PREDIABETES: ICD-10-CM

## 2023-07-31 DIAGNOSIS — E78.00 PURE HYPERCHOLESTEROLEMIA: ICD-10-CM

## 2023-07-31 LAB
HBA1C MFR BLD: 5.2 % (ref ?–5.8)
POCT INT CON NEG: NEGATIVE
POCT INT CON POS: POSITIVE

## 2023-07-31 PROCEDURE — 99214 OFFICE O/P EST MOD 30 MIN: CPT | Performed by: STUDENT IN AN ORGANIZED HEALTH CARE EDUCATION/TRAINING PROGRAM

## 2023-07-31 PROCEDURE — 3079F DIAST BP 80-89 MM HG: CPT | Performed by: STUDENT IN AN ORGANIZED HEALTH CARE EDUCATION/TRAINING PROGRAM

## 2023-07-31 PROCEDURE — 83036 HEMOGLOBIN GLYCOSYLATED A1C: CPT | Performed by: STUDENT IN AN ORGANIZED HEALTH CARE EDUCATION/TRAINING PROGRAM

## 2023-07-31 PROCEDURE — 3074F SYST BP LT 130 MM HG: CPT | Performed by: STUDENT IN AN ORGANIZED HEALTH CARE EDUCATION/TRAINING PROGRAM

## 2023-07-31 RX ORDER — CYCLOBENZAPRINE HCL 10 MG
10 TABLET ORAL NIGHTLY PRN
Qty: 30 TABLET | Refills: 0 | Status: SHIPPED | OUTPATIENT
Start: 2023-07-31 | End: 2023-11-29

## 2023-07-31 ASSESSMENT — FIBROSIS 4 INDEX: FIB4 SCORE: 0.87

## 2023-07-31 NOTE — PROGRESS NOTES
CC:  Diagnoses of Essential hypertension, Pure hypercholesterolemia, Prediabetes, Neck and shoulder pain, and Need for vaccination were pertinent to this visit.    HISTORY OF THE PRESENT ILLNESS: Patient is a 57 y.o. male. This pleasant patient is here today to discuss:    1. Essential hypertension  Lisinopril 20 mg daily.  Amlodipine 2.5 mg daily.  Patient reports full compliance.  No chronic dry cough or globus sensation reported.  No dependent edema.    2. Pure hypercholesterolemia  Rosuvastatin 10 mg daily.  Patient reports full compliance.  Patient reports no myalgias.    3. Prediabetes  Patient has been on and off with his exercise regimen and has been avoiding processed sugars.    4. Neck and shoulder pain  On and off for several weeks, patient has had tension and pain of the neck and upper shoulders.  This can be quite tense at times.  He has tried ibuprofen with only modest relief.  He has not been engaging in a stretching regimen.    5. Need for vaccination  Patient in need of his shingles vaccine 2/2.      Active Diagnosis:    Patient Active Problem List   Diagnosis    Hemorrhoid prolapse    Essential hypertension    Hx of hemorrhoids    Other specified anemias    FHx: Alzheimer's disease    Palpitation    Pure hypercholesterolemia    Prediabetes      Current Outpatient Medications Ordered in Epic   Medication Sig Dispense Refill    Zoster Vac Recomb Adjuvanted (SHINGRIX) 50 MCG/0.5ML Recon Susp Inject 0.5 mL into the shoulder, thigh, or buttocks one time for 1 dose. 0.5 mL 0    cyclobenzaprine (FLEXERIL) 10 mg Tab Take 1 Tablet by mouth at bedtime as needed for Mild Pain. 30 Tablet 0    rosuvastatin (CRESTOR) 10 MG Tab Take 1 Tablet by mouth every evening. 90 Tablet 1    amLODIPine (NORVASC) 2.5 MG Tab Take 1 Tablet by mouth every day. 90 Tablet 1    lisinopril (PRINIVIL) 20 MG Tab Take 1 Tablet by mouth every day. 90 Tablet 1    Probiotic Product (PROBIOTIC ADVANCED) Cap Take 1 Cap by mouth every day.    "   Omega-3 Fatty Acids (FISH OIL) 1000 MG Cap capsule Take 2,000 mg by mouth every day.      Ferrous Sulfate (IRON) 325 (65 Fe) MG Tab Take 1 Tab by mouth every day.      ascorbic acid (ASCORBIC ACID) 500 MG Tab Take 500 mg by mouth every 7 days. Indications: with iron       No current Epic-ordered facility-administered medications on file.     ROS:   See HPI.    Objective:     Exam: /80 (BP Location: Left arm, Patient Position: Sitting, BP Cuff Size: Adult)   Pulse 71   Temp 36.4 °C (97.6 °F) (Temporal)   Ht 1.6 m (5' 3\")   Wt 76.5 kg (168 lb 9.6 oz)   SpO2 95%  Body mass index is 29.87 kg/m².    General: Normal appearing. No distress.  Pulmonary: Clear to ausculation.  Normal effort. No rales, ronchi, or wheezing.  Cardiovascular: Regular rate and rhythm without murmur.   neurologic: Grossly nonfocal.    Skin: Warm and dry.  No obvious lesions.  Musculoskeletal: No extremity cyanosis, clubbing, or edema.  Psych: Normal mood and affect.             Assessment & Plan:   57 y.o. male with the following -    Labs:   7/31/2023:  -POCT hemoglobin A1c 5.2%.    12/20/2022:  -Lipid profile showing total cholesterol 125, triglycerides 131, HDL 39, LDL 60.  ASCVD score 6.4%.  -CMP showing elevated fasting glucose 116.    1. Essential hypertension  -Chronic, stable.  Well-controlled without side effect.  -Lisinopril 20 mg daily.  -Amlodipine 2.5 mg daily.    2. Pure hypercholesterolemia  -Chronic, stable.  -Rosuvastatin 10 mg daily.    3. Prediabetes  -Chronic.  -Patient encouraged to increase his aerobic activity and carefully watch carbohydrate intake of all types.  - POCT  A1C    4. Neck and shoulder pain  -Acute.  -Regular neck stretches have been recommended and demonstrated in office today.  -Okay for ibuprofen 800 mg 3 times daily as needed over the short-term.  Commend he take this with meals.  - cyclobenzaprine (FLEXERIL) 10 mg Tab; Take 1 Tablet by mouth at bedtime as needed for Mild Pain.  Dispense: 30 " Tablet; Refill: 0    5. Need for vaccination  - Zoster Vac Recomb Adjuvanted (SHINGRIX) 50 MCG/0.5ML Recon Susp; Inject 0.5 mL into the shoulder, thigh, or buttocks one time for 1 dose.  Dispense: 0.5 mL; Refill: 0    Return in about 6 months (around 1/31/2024).    Please note that this dictation was created using voice recognition software. I have made every reasonable attempt to correct obvious errors, but I expect that there are errors of grammar and possibly content that I did not discover before finalizing the note.      Nino Ryder PA-C 7/31/2023

## 2023-08-12 SDOH — HEALTH STABILITY: PHYSICAL HEALTH: ON AVERAGE, HOW MANY DAYS PER WEEK DO YOU ENGAGE IN MODERATE TO STRENUOUS EXERCISE (LIKE A BRISK WALK)?: 3 DAYS

## 2023-08-12 SDOH — ECONOMIC STABILITY: TRANSPORTATION INSECURITY
IN THE PAST 12 MONTHS, HAS LACK OF TRANSPORTATION KEPT YOU FROM MEETINGS, WORK, OR FROM GETTING THINGS NEEDED FOR DAILY LIVING?: NO

## 2023-08-12 SDOH — ECONOMIC STABILITY: TRANSPORTATION INSECURITY
IN THE PAST 12 MONTHS, HAS LACK OF RELIABLE TRANSPORTATION KEPT YOU FROM MEDICAL APPOINTMENTS, MEETINGS, WORK OR FROM GETTING THINGS NEEDED FOR DAILY LIVING?: NO

## 2023-08-12 SDOH — HEALTH STABILITY: PHYSICAL HEALTH: ON AVERAGE, HOW MANY MINUTES DO YOU ENGAGE IN EXERCISE AT THIS LEVEL?: 40 MIN

## 2023-08-12 SDOH — ECONOMIC STABILITY: HOUSING INSECURITY
IN THE LAST 12 MONTHS, WAS THERE A TIME WHEN YOU DID NOT HAVE A STEADY PLACE TO SLEEP OR SLEPT IN A SHELTER (INCLUDING NOW)?: NO

## 2023-08-12 SDOH — ECONOMIC STABILITY: TRANSPORTATION INSECURITY
IN THE PAST 12 MONTHS, HAS THE LACK OF TRANSPORTATION KEPT YOU FROM MEDICAL APPOINTMENTS OR FROM GETTING MEDICATIONS?: NO

## 2023-08-12 SDOH — ECONOMIC STABILITY: INCOME INSECURITY: IN THE LAST 12 MONTHS, WAS THERE A TIME WHEN YOU WERE NOT ABLE TO PAY THE MORTGAGE OR RENT ON TIME?: NO

## 2023-08-12 SDOH — ECONOMIC STABILITY: FOOD INSECURITY: WITHIN THE PAST 12 MONTHS, YOU WORRIED THAT YOUR FOOD WOULD RUN OUT BEFORE YOU GOT MONEY TO BUY MORE.: NEVER TRUE

## 2023-08-12 SDOH — ECONOMIC STABILITY: FOOD INSECURITY: WITHIN THE PAST 12 MONTHS, THE FOOD YOU BOUGHT JUST DIDN'T LAST AND YOU DIDN'T HAVE MONEY TO GET MORE.: NEVER TRUE

## 2023-08-12 SDOH — ECONOMIC STABILITY: HOUSING INSECURITY: IN THE LAST 12 MONTHS, HOW MANY PLACES HAVE YOU LIVED?: 1

## 2023-08-12 SDOH — HEALTH STABILITY: MENTAL HEALTH
STRESS IS WHEN SOMEONE FEELS TENSE, NERVOUS, ANXIOUS, OR CAN'T SLEEP AT NIGHT BECAUSE THEIR MIND IS TROUBLED. HOW STRESSED ARE YOU?: TO SOME EXTENT

## 2023-08-12 SDOH — ECONOMIC STABILITY: INCOME INSECURITY: HOW HARD IS IT FOR YOU TO PAY FOR THE VERY BASICS LIKE FOOD, HOUSING, MEDICAL CARE, AND HEATING?: NOT HARD AT ALL

## 2023-08-12 ASSESSMENT — SOCIAL DETERMINANTS OF HEALTH (SDOH)
HOW OFTEN DO YOU GET TOGETHER WITH FRIENDS OR RELATIVES?: NEVER
DO YOU BELONG TO ANY CLUBS OR ORGANIZATIONS SUCH AS CHURCH GROUPS UNIONS, FRATERNAL OR ATHLETIC GROUPS, OR SCHOOL GROUPS?: PATIENT DECLINED
IN A TYPICAL WEEK, HOW MANY TIMES DO YOU TALK ON THE PHONE WITH FAMILY, FRIENDS, OR NEIGHBORS?: MORE THAN THREE TIMES A WEEK
DO YOU BELONG TO ANY CLUBS OR ORGANIZATIONS SUCH AS CHURCH GROUPS UNIONS, FRATERNAL OR ATHLETIC GROUPS, OR SCHOOL GROUPS?: PATIENT DECLINED
HOW OFTEN DO YOU GET TOGETHER WITH FRIENDS OR RELATIVES?: NEVER
HOW OFTEN DO YOU ATTENT MEETINGS OF THE CLUB OR ORGANIZATION YOU BELONG TO?: 1 TO 4 TIMES PER YEAR
HOW OFTEN DO YOU ATTEND CHURCH OR RELIGIOUS SERVICES?: NEVER
HOW MANY DRINKS CONTAINING ALCOHOL DO YOU HAVE ON A TYPICAL DAY WHEN YOU ARE DRINKING: 1 OR 2
HOW OFTEN DO YOU ATTEND CHURCH OR RELIGIOUS SERVICES?: NEVER
WITHIN THE PAST 12 MONTHS, YOU WORRIED THAT YOUR FOOD WOULD RUN OUT BEFORE YOU GOT THE MONEY TO BUY MORE: NEVER TRUE
DO YOU BELONG TO ANY CLUBS OR ORGANIZATIONS SUCH AS CHURCH GROUPS UNIONS, FRATERNAL OR ATHLETIC GROUPS, OR SCHOOL GROUPS?: PATIENT DECLINED
IN A TYPICAL WEEK, HOW MANY TIMES DO YOU TALK ON THE PHONE WITH FAMILY, FRIENDS, OR NEIGHBORS?: MORE THAN THREE TIMES A WEEK
HOW OFTEN DO YOU HAVE A DRINK CONTAINING ALCOHOL: 2-3 TIMES A WEEK
HOW OFTEN DO YOU ATTENT MEETINGS OF THE CLUB OR ORGANIZATION YOU BELONG TO?: 1 TO 4 TIMES PER YEAR
HOW OFTEN DO YOU ATTEND CHURCH OR RELIGIOUS SERVICES?: NEVER
IN A TYPICAL WEEK, HOW MANY TIMES DO YOU TALK ON THE PHONE WITH FAMILY, FRIENDS, OR NEIGHBORS?: MORE THAN THREE TIMES A WEEK
HOW HARD IS IT FOR YOU TO PAY FOR THE VERY BASICS LIKE FOOD, HOUSING, MEDICAL CARE, AND HEATING?: NOT HARD AT ALL
HOW OFTEN DO YOU HAVE SIX OR MORE DRINKS ON ONE OCCASION: NEVER
HOW OFTEN DO YOU ATTENT MEETINGS OF THE CLUB OR ORGANIZATION YOU BELONG TO?: 1 TO 4 TIMES PER YEAR
HOW OFTEN DO YOU GET TOGETHER WITH FRIENDS OR RELATIVES?: NEVER

## 2023-08-12 ASSESSMENT — LIFESTYLE VARIABLES
HOW OFTEN DO YOU HAVE A DRINK CONTAINING ALCOHOL: 2-3 TIMES A WEEK
HOW MANY STANDARD DRINKS CONTAINING ALCOHOL DO YOU HAVE ON A TYPICAL DAY: 1 OR 2
AUDIT-C TOTAL SCORE: 3
HOW OFTEN DO YOU HAVE SIX OR MORE DRINKS ON ONE OCCASION: NEVER
HOW OFTEN DO YOU HAVE SIX OR MORE DRINKS ON ONE OCCASION: NEVER
SKIP TO QUESTIONS 9-10: 1
SKIP TO QUESTIONS 9-10: 1
HOW MANY STANDARD DRINKS CONTAINING ALCOHOL DO YOU HAVE ON A TYPICAL DAY: 1 OR 2
HOW OFTEN DO YOU HAVE A DRINK CONTAINING ALCOHOL: 2-3 TIMES A WEEK
AUDIT-C TOTAL SCORE: 3

## 2023-08-14 ENCOUNTER — APPOINTMENT (OUTPATIENT)
Dept: MEDICAL GROUP | Facility: PHYSICIAN GROUP | Age: 58
End: 2023-08-14
Payer: COMMERCIAL

## 2023-08-15 ENCOUNTER — DOCUMENTATION (OUTPATIENT)
Dept: HEALTH INFORMATION MANAGEMENT | Facility: OTHER | Age: 58
End: 2023-08-15
Payer: COMMERCIAL

## 2023-10-13 SDOH — ECONOMIC STABILITY: HOUSING INSECURITY: IN THE LAST 12 MONTHS, HOW MANY PLACES HAVE YOU LIVED?: 1

## 2023-10-13 SDOH — ECONOMIC STABILITY: INCOME INSECURITY: IN THE LAST 12 MONTHS, WAS THERE A TIME WHEN YOU WERE NOT ABLE TO PAY THE MORTGAGE OR RENT ON TIME?: NO

## 2023-10-13 SDOH — HEALTH STABILITY: PHYSICAL HEALTH: ON AVERAGE, HOW MANY DAYS PER WEEK DO YOU ENGAGE IN MODERATE TO STRENUOUS EXERCISE (LIKE A BRISK WALK)?: 3 DAYS

## 2023-10-13 SDOH — HEALTH STABILITY: PHYSICAL HEALTH: ON AVERAGE, HOW MANY MINUTES DO YOU ENGAGE IN EXERCISE AT THIS LEVEL?: 40 MIN

## 2023-10-13 SDOH — ECONOMIC STABILITY: INCOME INSECURITY: HOW HARD IS IT FOR YOU TO PAY FOR THE VERY BASICS LIKE FOOD, HOUSING, MEDICAL CARE, AND HEATING?: NOT HARD AT ALL

## 2023-10-13 SDOH — ECONOMIC STABILITY: FOOD INSECURITY: WITHIN THE PAST 12 MONTHS, THE FOOD YOU BOUGHT JUST DIDN'T LAST AND YOU DIDN'T HAVE MONEY TO GET MORE.: NEVER TRUE

## 2023-10-13 SDOH — ECONOMIC STABILITY: FOOD INSECURITY: WITHIN THE PAST 12 MONTHS, YOU WORRIED THAT YOUR FOOD WOULD RUN OUT BEFORE YOU GOT MONEY TO BUY MORE.: NEVER TRUE

## 2023-10-13 ASSESSMENT — SOCIAL DETERMINANTS OF HEALTH (SDOH)
HOW OFTEN DO YOU HAVE A DRINK CONTAINING ALCOHOL: 2-3 TIMES A WEEK
HOW OFTEN DO YOU HAVE SIX OR MORE DRINKS ON ONE OCCASION: NEVER
HOW OFTEN DO YOU ATTEND CHURCH OR RELIGIOUS SERVICES?: NEVER
HOW OFTEN DO YOU GET TOGETHER WITH FRIENDS OR RELATIVES?: NEVER
HOW OFTEN DO YOU ATTENT MEETINGS OF THE CLUB OR ORGANIZATION YOU BELONG TO?: 1 TO 4 TIMES PER YEAR
HOW MANY DRINKS CONTAINING ALCOHOL DO YOU HAVE ON A TYPICAL DAY WHEN YOU ARE DRINKING: 1 OR 2
DO YOU BELONG TO ANY CLUBS OR ORGANIZATIONS SUCH AS CHURCH GROUPS UNIONS, FRATERNAL OR ATHLETIC GROUPS, OR SCHOOL GROUPS?: PATIENT DECLINED
HOW HARD IS IT FOR YOU TO PAY FOR THE VERY BASICS LIKE FOOD, HOUSING, MEDICAL CARE, AND HEATING?: NOT HARD AT ALL
HOW OFTEN DO YOU GET TOGETHER WITH FRIENDS OR RELATIVES?: NEVER
IN A TYPICAL WEEK, HOW MANY TIMES DO YOU TALK ON THE PHONE WITH FAMILY, FRIENDS, OR NEIGHBORS?: MORE THAN THREE TIMES A WEEK
DO YOU BELONG TO ANY CLUBS OR ORGANIZATIONS SUCH AS CHURCH GROUPS UNIONS, FRATERNAL OR ATHLETIC GROUPS, OR SCHOOL GROUPS?: PATIENT DECLINED
HOW OFTEN DO YOU ATTENT MEETINGS OF THE CLUB OR ORGANIZATION YOU BELONG TO?: 1 TO 4 TIMES PER YEAR
WITHIN THE PAST 12 MONTHS, YOU WORRIED THAT YOUR FOOD WOULD RUN OUT BEFORE YOU GOT THE MONEY TO BUY MORE: NEVER TRUE
HOW OFTEN DO YOU ATTEND CHURCH OR RELIGIOUS SERVICES?: NEVER
IN A TYPICAL WEEK, HOW MANY TIMES DO YOU TALK ON THE PHONE WITH FAMILY, FRIENDS, OR NEIGHBORS?: MORE THAN THREE TIMES A WEEK

## 2023-10-13 ASSESSMENT — LIFESTYLE VARIABLES
HOW OFTEN DO YOU HAVE A DRINK CONTAINING ALCOHOL: 2-3 TIMES A WEEK
HOW OFTEN DO YOU HAVE SIX OR MORE DRINKS ON ONE OCCASION: NEVER
HOW MANY STANDARD DRINKS CONTAINING ALCOHOL DO YOU HAVE ON A TYPICAL DAY: 1 OR 2
SKIP TO QUESTIONS 9-10: 1
AUDIT-C TOTAL SCORE: 3

## 2023-10-16 ENCOUNTER — OFFICE VISIT (OUTPATIENT)
Dept: MEDICAL GROUP | Facility: PHYSICIAN GROUP | Age: 58
End: 2023-10-16
Payer: COMMERCIAL

## 2023-10-16 VITALS
HEART RATE: 82 BPM | OXYGEN SATURATION: 94 % | WEIGHT: 170 LBS | HEIGHT: 63 IN | BODY MASS INDEX: 30.12 KG/M2 | SYSTOLIC BLOOD PRESSURE: 120 MMHG | TEMPERATURE: 97.8 F | DIASTOLIC BLOOD PRESSURE: 76 MMHG

## 2023-10-16 DIAGNOSIS — D22.9 FLESHY SKIN MOLE: ICD-10-CM

## 2023-10-16 DIAGNOSIS — E78.00 PURE HYPERCHOLESTEROLEMIA: Chronic | ICD-10-CM

## 2023-10-16 DIAGNOSIS — I10 ESSENTIAL HYPERTENSION: Chronic | ICD-10-CM

## 2023-10-16 PROCEDURE — 3074F SYST BP LT 130 MM HG: CPT | Performed by: STUDENT IN AN ORGANIZED HEALTH CARE EDUCATION/TRAINING PROGRAM

## 2023-10-16 PROCEDURE — 3078F DIAST BP <80 MM HG: CPT | Performed by: STUDENT IN AN ORGANIZED HEALTH CARE EDUCATION/TRAINING PROGRAM

## 2023-10-16 PROCEDURE — 99214 OFFICE O/P EST MOD 30 MIN: CPT | Performed by: STUDENT IN AN ORGANIZED HEALTH CARE EDUCATION/TRAINING PROGRAM

## 2023-10-16 ASSESSMENT — ENCOUNTER SYMPTOMS
HEADACHES: 0
CHILLS: 0
PALPITATIONS: 0
SHORTNESS OF BREATH: 0
FEVER: 0
DIZZINESS: 0

## 2023-10-16 ASSESSMENT — FIBROSIS 4 INDEX: FIB4 SCORE: 0.89

## 2023-10-16 NOTE — PROGRESS NOTES
Subjective:     CC:    Chief Complaint   Patient presents with    Establish Care        HISTORY OF THE PRESENT ILLNESS: Patient is a 58 y.o. male. This pleasant patient is here today to establish care and discuss chronic conditions. Prior PCP was Nino Ryder.    Problem   Pure Hypercholesterolemia    Lab Results   Component Value Date/Time    CHOLSTRLTOT 125 02/17/2023 0637    TRIGLYCERIDE 131 02/17/2023 0637    HDL 39 (A) 02/17/2023 0637    LDL 60 02/17/2023 0637     On rosuvastatin 10 mg daily     Essential Hypertension    This is a chronic condition.  Current Meds:   - lisinopril 20 mg daily  - amlodipine 2.5 mg daily  Side effects: none  Associated symptoms: Denies chest pain, shortness of breath, headaches, dizziness/lightheadedness              Past Medical History: Diagnoses of Essential hypertension and Pure hypercholesterolemia were pertinent to this visit.    Current Outpatient Medications   Medication Sig    cyclobenzaprine (FLEXERIL) 10 mg Tab Take 1 Tablet by mouth at bedtime as needed for Mild Pain.    rosuvastatin (CRESTOR) 10 MG Tab Take 1 Tablet by mouth every evening.    amLODIPine (NORVASC) 2.5 MG Tab Take 1 Tablet by mouth every day.    lisinopril (PRINIVIL) 20 MG Tab Take 1 Tablet by mouth every day.    Probiotic Product (PROBIOTIC ADVANCED) Cap Take 1 Cap by mouth every day.    Ferrous Sulfate (IRON) 325 (65 Fe) MG Tab Take 1 Tab by mouth every day.    ascorbic acid (ASCORBIC ACID) 500 MG Tab Take 500 mg by mouth every 7 days. Indications: with iron        Social History     Socioeconomic History    Marital status:     Highest education level: Master's degree (e.g., MA, MS, [employer], MEd, MSW, [employer])   Tobacco Use    Smoking status: Never    Smokeless tobacco: Never   Vaping Use    Vaping Use: Never used   Substance and Sexual Activity    Alcohol use: Yes     Alcohol/week: 0.6 oz     Types: 1 Standard drinks or equivalent per week     Comment: once week    Drug use: No     Social  Determinants of Health     Financial Resource Strain: Low Risk  (10/13/2023)    Overall Financial Resource Strain (CARDIA)     Difficulty of Paying Living Expenses: Not hard at all   Food Insecurity: No Food Insecurity (10/13/2023)    Hunger Vital Sign     Worried About Running Out of Food in the Last Year: Never true     Ran Out of Food in the Last Year: Never true   Transportation Needs: No Transportation Needs (10/13/2023)    PRAPARE - Transportation     Lack of Transportation (Medical): No     Lack of Transportation (Non-Medical): No   Physical Activity: Insufficiently Active (10/13/2023)    Exercise Vital Sign     Days of Exercise per Week: 3 days     Minutes of Exercise per Session: 40 min   Stress: Stress Concern Present (10/13/2023)    [employer] - Occupational Stress Questionnaire     Feeling of Stress : To some extent   Social Connections: Moderately Isolated (10/13/2023)    Social Connection and Isolation Panel [NHANES]     Frequency of Communication with Friends and Family: More than three times a week     Frequency of Social Gatherings with Friends and Family: Never     Attends Sabianist Services: Never     Active Member of Clubs or Organizations: Patient refused     Attends Club or Organization Meetings: 1 to 4 times per year     Marital Status:    Housing Stability: Low Risk  (10/13/2023)    Housing Stability Vital Sign     Unable to Pay for Housing in the Last Year: No     Number of Places Lived in the Last Year: 1     Unstable Housing in the Last Year: No       Family History   Problem Relation Age of Onset    Breast Cancer Mother     Prostate cancer Father          Health Maintenance: Completed      ROS:   Review of Systems   Constitutional:  Negative for chills and fever.   Respiratory:  Negative for shortness of breath.    Cardiovascular:  Negative for chest pain and palpitations.   Neurological:  Negative for dizziness and headaches.       Objective:  "    Exam: /76 (BP Location: Left arm, Patient Position: Sitting, BP Cuff Size: Adult)   Pulse 82   Temp 36.6 °C (97.8 °F) (Temporal)   Ht 1.6 m (5' 3\")   Wt 77.1 kg (170 lb)   SpO2 94%  Body mass index is 30.11 kg/m².    Physical Exam  Vitals reviewed.   Constitutional:       General: He is not in acute distress.     Appearance: He is not toxic-appearing.   HENT:      Right Ear: External ear normal.      Left Ear: External ear normal.      Mouth/Throat:      Mouth: Mucous membranes are moist.   Eyes:      Extraocular Movements: Extraocular movements intact.      Conjunctiva/sclera: Conjunctivae normal.   Cardiovascular:      Rate and Rhythm: Normal rate and regular rhythm.      Heart sounds: No murmur heard.     No friction rub. No gallop.   Pulmonary:      Effort: Pulmonary effort is normal.      Breath sounds: No wheezing, rhonchi or rales.   Musculoskeletal:      Cervical back: Normal range of motion and neck supple.      Right lower leg: No edema.      Left lower leg: No edema.   Lymphadenopathy:      Cervical: No cervical adenopathy.   Skin:     General: Skin is warm and dry.      Comments: 0.5 cm raised flesh-colored mole is noted to the patient's right upper back   Neurological:      Mental Status: He is alert.   Psychiatric:         Mood and Affect: Mood normal.         Labs: Recent labs were reviewed.    Assessment & Plan:     58 y.o. male with the following -    1. Essential hypertension  Chronic, controlled.  Blood pressure is at goal today.  Continue current regimen.    2. Pure hypercholesterolemia  Chronic, controlled. He is on a statin for primary prevention and tolerating it well. The last cholesterol panel in 02/2023 was within normal limits. Continue current dosing.    3. Fleshy skin mole  Chronic in nature.  Patient has a raised skin mole of his right upper back, which he does say is sometimes itchy and sometimes catches on things.  He would like it removed.  We will schedule an " appointment for excision of this lesion.          Return in about 6 weeks (around 11/27/2023) for mole removal (40 minutes).    Please note that this dictation was created using voice recognition software. I have made every reasonable attempt to correct obvious errors, but I expect that there are errors of grammar and possibly content that I did not discover before finalizing the note.

## 2023-11-10 DIAGNOSIS — I10 ESSENTIAL HYPERTENSION: ICD-10-CM

## 2023-11-10 DIAGNOSIS — E78.00 PURE HYPERCHOLESTEROLEMIA: ICD-10-CM

## 2023-11-10 RX ORDER — LISINOPRIL 20 MG/1
20 TABLET ORAL DAILY
Qty: 90 TABLET | Refills: 1 | Status: SHIPPED | OUTPATIENT
Start: 2023-11-10 | End: 2024-02-12 | Stop reason: SDUPTHER

## 2023-11-10 RX ORDER — ROSUVASTATIN CALCIUM 10 MG/1
10 TABLET, COATED ORAL EVERY EVENING
Qty: 90 TABLET | Refills: 1 | Status: SHIPPED | OUTPATIENT
Start: 2023-11-10 | End: 2024-02-12 | Stop reason: SDUPTHER

## 2023-11-10 RX ORDER — AMLODIPINE BESYLATE 2.5 MG/1
2.5 TABLET ORAL DAILY
Qty: 90 TABLET | Refills: 1 | Status: SHIPPED | OUTPATIENT
Start: 2023-11-10 | End: 2024-02-12 | Stop reason: SDUPTHER

## 2023-11-11 NOTE — TELEPHONE ENCOUNTER
Received request via: Pharmacy    Was the patient seen in the last year in this department? Yes    Does the patient have an active prescription (recently filled or refills available) for medication(s) requested? No    Does the patient have care home Plus and need 100 day supply (blood pressure, diabetes and cholesterol meds only)? Patient does not have SCP   Treatment Goal Explanation (Does Not Render In The Note): Stable for the purposes of categorizing medical decision making is defined by the specific treatment goals for an individual patient. A patient that is not at their treatment goal is not stable, even if the condition has not changed and there is no short- term threat to life or function.

## 2023-11-29 ENCOUNTER — HOSPITAL ENCOUNTER (OUTPATIENT)
Facility: MEDICAL CENTER | Age: 58
End: 2023-11-29
Attending: STUDENT IN AN ORGANIZED HEALTH CARE EDUCATION/TRAINING PROGRAM
Payer: COMMERCIAL

## 2023-11-29 ENCOUNTER — OFFICE VISIT (OUTPATIENT)
Dept: MEDICAL GROUP | Facility: PHYSICIAN GROUP | Age: 58
End: 2023-11-29
Payer: COMMERCIAL

## 2023-11-29 VITALS
HEIGHT: 63 IN | TEMPERATURE: 97.2 F | HEART RATE: 82 BPM | OXYGEN SATURATION: 97 % | BODY MASS INDEX: 30.48 KG/M2 | DIASTOLIC BLOOD PRESSURE: 78 MMHG | WEIGHT: 172 LBS | SYSTOLIC BLOOD PRESSURE: 122 MMHG

## 2023-11-29 DIAGNOSIS — E55.9 VITAMIN D DEFICIENCY: ICD-10-CM

## 2023-11-29 DIAGNOSIS — E78.00 PURE HYPERCHOLESTEROLEMIA: Chronic | ICD-10-CM

## 2023-11-29 DIAGNOSIS — R25.2 LEG CRAMPS: ICD-10-CM

## 2023-11-29 DIAGNOSIS — Z76.89 ENCOUNTER FOR BIOPSY: ICD-10-CM

## 2023-11-29 DIAGNOSIS — D22.9 NEVUS: ICD-10-CM

## 2023-11-29 DIAGNOSIS — I10 ESSENTIAL HYPERTENSION: Chronic | ICD-10-CM

## 2023-11-29 DIAGNOSIS — R73.03 PREDIABETES: ICD-10-CM

## 2023-11-29 LAB — PATHOLOGY CONSULT NOTE: NORMAL

## 2023-11-29 PROCEDURE — 88304 TISSUE EXAM BY PATHOLOGIST: CPT

## 2023-11-29 PROCEDURE — 88305 TISSUE EXAM BY PATHOLOGIST: CPT

## 2023-11-29 PROCEDURE — 11301 SHAVE SKIN LESION 0.6-1.0 CM: CPT | Performed by: STUDENT IN AN ORGANIZED HEALTH CARE EDUCATION/TRAINING PROGRAM

## 2023-11-29 PROCEDURE — 99213 OFFICE O/P EST LOW 20 MIN: CPT | Mod: 25 | Performed by: STUDENT IN AN ORGANIZED HEALTH CARE EDUCATION/TRAINING PROGRAM

## 2023-11-29 PROCEDURE — 3074F SYST BP LT 130 MM HG: CPT | Performed by: STUDENT IN AN ORGANIZED HEALTH CARE EDUCATION/TRAINING PROGRAM

## 2023-11-29 PROCEDURE — 3078F DIAST BP <80 MM HG: CPT | Performed by: STUDENT IN AN ORGANIZED HEALTH CARE EDUCATION/TRAINING PROGRAM

## 2023-11-29 RX ADMIN — Medication 0.5 ML: at 10:52

## 2023-11-29 ASSESSMENT — FIBROSIS 4 INDEX: FIB4 SCORE: 0.89

## 2023-11-29 ASSESSMENT — ENCOUNTER SYMPTOMS
CHILLS: 0
SHORTNESS OF BREATH: 0
FEVER: 0

## 2023-11-29 NOTE — PROGRESS NOTES
"Subjective:     CC:   Chief Complaint   Patient presents with    Nevus     Mole removal; on back        HPI:   Elliott presents today for mole removal.  He has a flesh-colored mole to the right upper back, which has been there for some time.  Sometimes catches on things and is sometimes itchy.    Past medical, family, and social history reviewed by me in Epic chart today.   Medications and allergies reviewed in Epic chart by me today.       Health Maintenance: Completed    ROS:  Review of Systems   Constitutional:  Negative for chills and fever.   Respiratory:  Negative for shortness of breath.    Cardiovascular:  Negative for chest pain.       Objective:     Exam:  /78 (BP Location: Left arm, Patient Position: Sitting, BP Cuff Size: Adult)   Pulse 82   Temp 36.2 °C (97.2 °F) (Temporal)   Ht 1.6 m (5' 3\")   Wt 78 kg (172 lb)   SpO2 97%   BMI 30.47 kg/m²  Body mass index is 30.47 kg/m².    Physical Exam  Constitutional:       General: He is not in acute distress.     Appearance: He is not toxic-appearing.   HENT:      Mouth/Throat:      Mouth: Mucous membranes are moist.   Eyes:      Extraocular Movements: Extraocular movements intact.   Pulmonary:      Effort: Pulmonary effort is normal.   Musculoskeletal:      Cervical back: Neck supple.   Skin:     General: Skin is warm and dry.      Comments: Approximately 1 cm flesh-colored nevus to the right upper back, with a smaller stalk.   Neurological:      Mental Status: He is alert and oriented to person, place, and time.   Psychiatric:         Mood and Affect: Mood normal.         Assessment & Plan:     58 y.o. male with the following -     1. Nevus  Chronic in nature.  Here for mole removal and biopsy.  The mole has been there for quite a while and has not changed.  It does sometimes catch on his clothing or becomes itchy. The lesion was removed by shave procedure without complications. See procedure note. The specimen will be sent to pathology.  " Post-procedure care was discussed with the patient. Return precautions reviewed, patient will come back in or contact the clinic if they develop any signs or symptoms of infection or have any other concerns.   - Consent for AMB Surgery/Procedure  - lidocaine (Xylocaine) 1 % injection 0.5 mL  - Biopsy of Skin Lesion; Future  - Pathology Specimen; Future    2. Essential hypertension  Chronic, controlled.  Blood pressure is at goal today.  Continue current regimen.   - Basic Metabolic Panel; Future    3. Pure hypercholesterolemia  - Lipid Profile; Future    4. Prediabetes  - HEMOGLOBIN A1C; Future    5. Vitamin D deficiency  - VITAMIN D,25 HYDROXY (DEFICIENCY); Future    6. Leg cramps  - IRON/TOTAL IRON BIND; Future  - FERRITIN; Future  - MAGNESIUM; Future      Return in about 3 months (around 2/29/2024) for lab follow up.    Please note that this dictation was created using voice recognition software. I have made every reasonable attempt to correct obvious errors, but I expect that there are errors of grammar and possibly content that I did not discover before finalizing the note.

## 2023-11-29 NOTE — PROCEDURES
SHAVE BIOPSY PROCEDURE NOTE:    Location of lesion: right upper back  Reason for removal: Irritating to patient and sometimes becomes inflamed    Discussed with the patient the risks, benefits and alternatives to excision of the lesion. Written consent was obtained. Immediately prior to procedure, a time out was called to verify the correct patient, procedure, equipment, support staff and site/side marked as required.    The area was alcohol swabbed and 0.5 cc of 1% lidocaine without epinephrine was administered. The area was then prepped and draped in the usual sterile fashion. A shave biopsy was used to excise the lesion. The excision was approximately 1 cm by 1 cm.  The specimen was placed in a pathology jar and sent to the lab.  Hemostasis was obtained with gentle pressure/heat-based cautery.  Antibiotic ointment and bandage was applied. The patient was given wound care instructions and follow-up precautions.

## 2023-12-05 ENCOUNTER — HOSPITAL ENCOUNTER (OUTPATIENT)
Dept: LAB | Facility: MEDICAL CENTER | Age: 58
End: 2023-12-05
Attending: STUDENT IN AN ORGANIZED HEALTH CARE EDUCATION/TRAINING PROGRAM
Payer: COMMERCIAL

## 2023-12-05 DIAGNOSIS — R25.2 LEG CRAMPS: ICD-10-CM

## 2023-12-05 DIAGNOSIS — E78.00 PURE HYPERCHOLESTEROLEMIA: Chronic | ICD-10-CM

## 2023-12-05 DIAGNOSIS — E55.9 VITAMIN D DEFICIENCY: ICD-10-CM

## 2023-12-05 DIAGNOSIS — R79.89 ELEVATED FERRITIN: ICD-10-CM

## 2023-12-05 DIAGNOSIS — R73.03 PREDIABETES: ICD-10-CM

## 2023-12-05 DIAGNOSIS — I10 ESSENTIAL HYPERTENSION: Chronic | ICD-10-CM

## 2023-12-05 LAB
25(OH)D3 SERPL-MCNC: 26 NG/ML (ref 30–100)
ANION GAP SERPL CALC-SCNC: 13 MMOL/L (ref 7–16)
BUN SERPL-MCNC: 19 MG/DL (ref 8–22)
CALCIUM SERPL-MCNC: 8.9 MG/DL (ref 8.5–10.5)
CHLORIDE SERPL-SCNC: 108 MMOL/L (ref 96–112)
CHOLEST SERPL-MCNC: 120 MG/DL (ref 100–199)
CO2 SERPL-SCNC: 22 MMOL/L (ref 20–33)
CREAT SERPL-MCNC: 0.76 MG/DL (ref 0.5–1.4)
EST. AVERAGE GLUCOSE BLD GHB EST-MCNC: 105 MG/DL
FASTING STATUS PATIENT QL REPORTED: NORMAL
FERRITIN SERPL-MCNC: 332 NG/ML (ref 22–322)
GFR SERPLBLD CREATININE-BSD FMLA CKD-EPI: 103 ML/MIN/1.73 M 2
GLUCOSE SERPL-MCNC: 116 MG/DL (ref 65–99)
HBA1C MFR BLD: 5.3 % (ref 4–5.6)
HDLC SERPL-MCNC: 35 MG/DL
IRON SATN MFR SERPL: 33 % (ref 15–55)
IRON SERPL-MCNC: 89 UG/DL (ref 50–180)
LDLC SERPL CALC-MCNC: 56 MG/DL
MAGNESIUM SERPL-MCNC: 1.9 MG/DL (ref 1.5–2.5)
POTASSIUM SERPL-SCNC: 3.7 MMOL/L (ref 3.6–5.5)
SODIUM SERPL-SCNC: 143 MMOL/L (ref 135–145)
TIBC SERPL-MCNC: 269 UG/DL (ref 250–450)
TRIGL SERPL-MCNC: 145 MG/DL (ref 0–149)
UIBC SERPL-MCNC: 180 UG/DL (ref 110–370)

## 2023-12-05 PROCEDURE — 80061 LIPID PANEL: CPT

## 2023-12-05 PROCEDURE — 83735 ASSAY OF MAGNESIUM: CPT

## 2023-12-05 PROCEDURE — 36415 COLL VENOUS BLD VENIPUNCTURE: CPT

## 2023-12-05 PROCEDURE — 82728 ASSAY OF FERRITIN: CPT

## 2023-12-05 PROCEDURE — 83550 IRON BINDING TEST: CPT

## 2023-12-05 PROCEDURE — 82306 VITAMIN D 25 HYDROXY: CPT

## 2023-12-05 PROCEDURE — 83036 HEMOGLOBIN GLYCOSYLATED A1C: CPT

## 2023-12-05 PROCEDURE — 80048 BASIC METABOLIC PNL TOTAL CA: CPT

## 2023-12-05 PROCEDURE — 83540 ASSAY OF IRON: CPT

## 2024-02-12 DIAGNOSIS — I10 ESSENTIAL HYPERTENSION: ICD-10-CM

## 2024-02-12 DIAGNOSIS — E78.00 PURE HYPERCHOLESTEROLEMIA: ICD-10-CM

## 2024-02-12 RX ORDER — LISINOPRIL 20 MG/1
20 TABLET ORAL DAILY
Qty: 90 TABLET | Refills: 0 | Status: SHIPPED | OUTPATIENT
Start: 2024-02-12

## 2024-02-12 RX ORDER — ROSUVASTATIN CALCIUM 10 MG/1
10 TABLET, COATED ORAL EVERY EVENING
Qty: 90 TABLET | Refills: 0 | Status: SHIPPED | OUTPATIENT
Start: 2024-02-12

## 2024-02-12 RX ORDER — AMLODIPINE BESYLATE 2.5 MG/1
2.5 TABLET ORAL DAILY
Qty: 90 TABLET | Refills: 0 | Status: SHIPPED | OUTPATIENT
Start: 2024-02-12

## 2024-02-12 NOTE — TELEPHONE ENCOUNTER
Patient would like a refill for the below prescriptions and fax the prescription and order form to    [phone]    amLODIPine (NORVASC) 2.5       lisinopril (PRINIVIL) 20 MG Tab   rosuvastatin (CRESTOR) 10 MG Tab     Thank you

## 2024-02-13 NOTE — TELEPHONE ENCOUNTER
Phone Number Called: [phone] (home) [phone] (work)      Call outcome: Spoke to patient regarding message below.    Message: Pt wasn't sure what pharmacy the fax number belonged to, he got it from his card which is [id number] so we think that's it.

## 2024-02-13 NOTE — TELEPHONE ENCOUNTER
Received request via: Pharmacy    Was the patient seen in the last year in this department? Yes    Does the patient have an active prescription (recently filled or refills available) for medication(s) requested? No    Pharmacy Name: [facility]    Does the patient have senior living Plus and need 100 day supply (blood pressure, diabetes and cholesterol meds only)? Patient does not have SCP

## 2024-02-29 ENCOUNTER — OFFICE VISIT (OUTPATIENT)
Dept: MEDICAL GROUP | Facility: PHYSICIAN GROUP | Age: 59
End: 2024-02-29
Payer: COMMERCIAL

## 2024-02-29 VITALS
SYSTOLIC BLOOD PRESSURE: 118 MMHG | OXYGEN SATURATION: 96 % | DIASTOLIC BLOOD PRESSURE: 74 MMHG | TEMPERATURE: 97.5 F | HEART RATE: 84 BPM | WEIGHT: 173.4 LBS | HEIGHT: 63 IN | BODY MASS INDEX: 30.72 KG/M2

## 2024-02-29 DIAGNOSIS — E78.00 PURE HYPERCHOLESTEROLEMIA: Chronic | ICD-10-CM

## 2024-02-29 DIAGNOSIS — I10 ESSENTIAL HYPERTENSION: Chronic | ICD-10-CM

## 2024-02-29 DIAGNOSIS — R79.89 ELEVATED FERRITIN: ICD-10-CM

## 2024-02-29 DIAGNOSIS — R73.03 PREDIABETES: ICD-10-CM

## 2024-02-29 PROCEDURE — 3078F DIAST BP <80 MM HG: CPT | Performed by: STUDENT IN AN ORGANIZED HEALTH CARE EDUCATION/TRAINING PROGRAM

## 2024-02-29 PROCEDURE — 3074F SYST BP LT 130 MM HG: CPT | Performed by: STUDENT IN AN ORGANIZED HEALTH CARE EDUCATION/TRAINING PROGRAM

## 2024-02-29 PROCEDURE — 99214 OFFICE O/P EST MOD 30 MIN: CPT | Performed by: STUDENT IN AN ORGANIZED HEALTH CARE EDUCATION/TRAINING PROGRAM

## 2024-02-29 ASSESSMENT — PATIENT HEALTH QUESTIONNAIRE - PHQ9: CLINICAL INTERPRETATION OF PHQ2 SCORE: 0

## 2024-02-29 ASSESSMENT — ENCOUNTER SYMPTOMS
CHILLS: 0
FEVER: 0
DIZZINESS: 0
SHORTNESS OF BREATH: 0
HEADACHES: 0

## 2024-02-29 ASSESSMENT — FIBROSIS 4 INDEX: FIB4 SCORE: 0.89

## 2024-02-29 NOTE — PROGRESS NOTES
"Subjective:     CC:   Chief Complaint   Patient presents with    Follow-Up       HPI:   Elliott presents today with    Problem   Elevated Ferritin    Mildly elevated on recent labs. Patient does take an iron supplement 1-2 times per week because it seems to help with leg cramps.     Pure Hypercholesterolemia    Chronic condition.  On rosuvastatin 10 mg daily.  No side effects.    Lab Results   Component Value Date/Time    CHOLSTRLTOT 120 12/05/2023 0647    TRIGLYCERIDE 145 12/05/2023 0647    HDL 35 (A) 12/05/2023 0647    LDL 56 12/05/2023 0647        Essential Hypertension    This is a chronic condition.  Current Meds:   - lisinopril 20 mg daily  - amlodipine 2.5 mg daily  Side effects: none  Associated symptoms: Denies chest pain, shortness of breath, headaches, dizziness/lightheadedness               Past medical, family, and social history reviewed by me in Epic chart today.   Medications and allergies reviewed in Epic chart by me today.       Health Maintenance: Completed    ROS:  Review of Systems   Constitutional:  Negative for chills and fever.   Respiratory:  Negative for shortness of breath.    Cardiovascular:  Negative for chest pain.   Neurological:  Negative for dizziness and headaches.       Objective:     Exam:  /74 (BP Location: Left arm, Patient Position: Sitting, BP Cuff Size: Adult long)   Pulse 84   Temp 36.4 °C (97.5 °F) (Temporal)   Ht 1.6 m (5' 3\")   Wt 78.7 kg (173 lb 6.4 oz)   SpO2 96%   BMI 30.72 kg/m²  Body mass index is 30.72 kg/m².    Physical Exam  Constitutional:       General: He is not in acute distress.  HENT:      Mouth/Throat:      Mouth: Mucous membranes are moist.   Eyes:      Extraocular Movements: Extraocular movements intact.   Cardiovascular:      Rate and Rhythm: Normal rate and regular rhythm.      Heart sounds: No murmur heard.     No friction rub. No gallop.   Pulmonary:      Effort: Pulmonary effort is normal.      Breath sounds: No wheezing, rhonchi or " rales.   Musculoskeletal:      Cervical back: Neck supple.   Skin:     General: Skin is warm and dry.   Neurological:      Mental Status: He is alert.   Psychiatric:         Mood and Affect: Mood normal.         Labs:     Results for orders placed or performed during the hospital encounter of 12/05/23   MAGNESIUM   Result Value Ref Range    Magnesium 1.9 1.5 - 2.5 mg/dL   FERRITIN   Result Value Ref Range    Ferritin 332.0 (H) 22.0 - 322.0 ng/mL   IRON/TOTAL IRON BIND   Result Value Ref Range    Iron 89 50 - 180 ug/dL    Total Iron Binding 269 250 - 450 ug/dL    Unsat Iron Binding 180 110 - 370 ug/dL    % Saturation 33 15 - 55 %   HEMOGLOBIN A1C   Result Value Ref Range    Glycohemoglobin 5.3 4.0 - 5.6 %    Est Avg Glucose 105 mg/dL   Lipid Profile   Result Value Ref Range    Cholesterol,Tot 120 100 - 199 mg/dL    Triglycerides 145 0 - 149 mg/dL    HDL 35 (A) >=40 mg/dL    LDL 56 <100 mg/dL   VITAMIN D,25 HYDROXY (DEFICIENCY)   Result Value Ref Range    25-Hydroxy   Vitamin D 25 26 (L) 30 - 100 ng/mL   Basic Metabolic Panel   Result Value Ref Range    Sodium 143 135 - 145 mmol/L    Potassium 3.7 3.6 - 5.5 mmol/L    Chloride 108 96 - 112 mmol/L    Co2 22 20 - 33 mmol/L    Glucose 116 (H) 65 - 99 mg/dL    Bun 19 8 - 22 mg/dL    Creatinine 0.76 0.50 - 1.40 mg/dL    Calcium 8.9 8.5 - 10.5 mg/dL    Anion Gap 13.0 7.0 - 16.0   FASTING STATUS   Result Value Ref Range    Fasting Status Fasting    ESTIMATED GFR   Result Value Ref Range    GFR (CKD-EPI) 103 >60 mL/min/1.73 m 2         Assessment & Plan:     58 y.o. male with the following -     1. Pure hypercholesterolemia  Chronic, controlled. He is on a statin for primary prevention and tolerating it well. The last cholesterol panel was within normal limits. Continue current dosing.     2. Essential hypertension  Chronic, controlled.  Blood pressure is at goal today.  Continue current regimen.     3. Elevated ferritin  Noted on recent labs. Will continue to monitor and  check CBC and LFTs.  - FERRITIN; Future  - IRON/TOTAL IRON BIND; Future  - CBC WITH DIFFERENTIAL; Future  - HEPATIC FUNCTION PANEL; Future    4. Prediabetes  - HEMOGLOBIN A1C; Future          Return in about 6 months (around 8/29/2024) for follow up.    Please note that this dictation was created using voice recognition software. I have made every reasonable attempt to correct obvious errors, but I expect that there are errors of grammar and possibly content that I did not discover before finalizing the note.

## 2024-03-11 ENCOUNTER — HOSPITAL ENCOUNTER (OUTPATIENT)
Dept: LAB | Facility: MEDICAL CENTER | Age: 59
End: 2024-03-11
Attending: STUDENT IN AN ORGANIZED HEALTH CARE EDUCATION/TRAINING PROGRAM
Payer: COMMERCIAL

## 2024-03-11 DIAGNOSIS — R79.89 ELEVATED FERRITIN: ICD-10-CM

## 2024-03-11 DIAGNOSIS — R74.8 ELEVATED ALKALINE PHOSPHATASE LEVEL: ICD-10-CM

## 2024-03-11 DIAGNOSIS — R73.03 PREDIABETES: ICD-10-CM

## 2024-03-11 LAB
ALBUMIN SERPL BCP-MCNC: 3.9 G/DL (ref 3.2–4.9)
ALP SERPL-CCNC: 105 U/L (ref 30–99)
ALT SERPL-CCNC: 18 U/L (ref 2–50)
AST SERPL-CCNC: 17 U/L (ref 12–45)
BASOPHILS # BLD AUTO: 0.9 % (ref 0–1.8)
BASOPHILS # BLD: 0.07 K/UL (ref 0–0.12)
BILIRUB CONJ SERPL-MCNC: <0.2 MG/DL (ref 0.1–0.5)
BILIRUB INDIRECT SERPL-MCNC: ABNORMAL MG/DL (ref 0–1)
BILIRUB SERPL-MCNC: 0.6 MG/DL (ref 0.1–1.5)
EOSINOPHIL # BLD AUTO: 0.2 K/UL (ref 0–0.51)
EOSINOPHIL NFR BLD: 2.4 % (ref 0–6.9)
ERYTHROCYTE [DISTWIDTH] IN BLOOD BY AUTOMATED COUNT: 40.6 FL (ref 35.9–50)
EST. AVERAGE GLUCOSE BLD GHB EST-MCNC: 103 MG/DL
FERRITIN SERPL-MCNC: 455 NG/ML (ref 22–322)
HBA1C MFR BLD: 5.2 % (ref 4–5.6)
HCT VFR BLD AUTO: 46.8 % (ref 42–52)
HGB BLD-MCNC: 16.7 G/DL (ref 14–18)
IMM GRANULOCYTES # BLD AUTO: 0.03 K/UL (ref 0–0.11)
IMM GRANULOCYTES NFR BLD AUTO: 0.4 % (ref 0–0.9)
IRON SATN MFR SERPL: 49 % (ref 15–55)
IRON SERPL-MCNC: 131 UG/DL (ref 50–180)
LYMPHOCYTES # BLD AUTO: 2.33 K/UL (ref 1–4.8)
LYMPHOCYTES NFR BLD: 28.3 % (ref 22–41)
MCH RBC QN AUTO: 30.6 PG (ref 27–33)
MCHC RBC AUTO-ENTMCNC: 35.7 G/DL (ref 32.3–36.5)
MCV RBC AUTO: 85.7 FL (ref 81.4–97.8)
MONOCYTES # BLD AUTO: 0.68 K/UL (ref 0–0.85)
MONOCYTES NFR BLD AUTO: 8.3 % (ref 0–13.4)
NEUTROPHILS # BLD AUTO: 4.91 K/UL (ref 1.82–7.42)
NEUTROPHILS NFR BLD: 59.7 % (ref 44–72)
NRBC # BLD AUTO: 0 K/UL
NRBC BLD-RTO: 0 /100 WBC (ref 0–0.2)
PLATELET # BLD AUTO: 251 K/UL (ref 164–446)
PMV BLD AUTO: 11.2 FL (ref 9–12.9)
PROT SERPL-MCNC: 6.6 G/DL (ref 6–8.2)
RBC # BLD AUTO: 5.46 M/UL (ref 4.7–6.1)
TIBC SERPL-MCNC: 265 UG/DL (ref 250–450)
UIBC SERPL-MCNC: 134 UG/DL (ref 110–370)
WBC # BLD AUTO: 8.2 K/UL (ref 4.8–10.8)

## 2024-03-11 PROCEDURE — 83550 IRON BINDING TEST: CPT

## 2024-03-11 PROCEDURE — 83036 HEMOGLOBIN GLYCOSYLATED A1C: CPT

## 2024-03-11 PROCEDURE — 82728 ASSAY OF FERRITIN: CPT

## 2024-03-11 PROCEDURE — 36415 COLL VENOUS BLD VENIPUNCTURE: CPT

## 2024-03-11 PROCEDURE — 83540 ASSAY OF IRON: CPT

## 2024-03-11 PROCEDURE — 80076 HEPATIC FUNCTION PANEL: CPT

## 2024-03-11 PROCEDURE — 85025 COMPLETE CBC W/AUTO DIFF WBC: CPT

## 2024-04-28 DIAGNOSIS — E78.00 PURE HYPERCHOLESTEROLEMIA: ICD-10-CM

## 2024-04-28 DIAGNOSIS — I10 ESSENTIAL HYPERTENSION: ICD-10-CM

## 2024-04-29 RX ORDER — AMLODIPINE BESYLATE 2.5 MG/1
2.5 TABLET ORAL DAILY
Qty: 90 TABLET | Refills: 3 | Status: SHIPPED | OUTPATIENT
Start: 2024-04-29

## 2024-04-29 RX ORDER — LISINOPRIL 20 MG/1
20 TABLET ORAL DAILY
Qty: 90 TABLET | Refills: 3 | Status: SHIPPED | OUTPATIENT
Start: 2024-04-29

## 2024-04-29 RX ORDER — ROSUVASTATIN CALCIUM 10 MG/1
10 TABLET, COATED ORAL EVERY EVENING
Qty: 90 TABLET | Refills: 3 | Status: SHIPPED | OUTPATIENT
Start: 2024-04-29

## 2024-04-29 NOTE — TELEPHONE ENCOUNTER
Future Appointments         Provider Department Center    9/4/2024 1:00 PM (Arrive by 12:45 PM) Tatiana Mcdonald P.A.-C. [facility] - [address]         Received request via: Pharmacy    Was the patient seen in the last year in this department? Yes    Does the patient have an active prescription (recently filled or refills available) for medication(s) requested? No    Pharmacy Name: [facility]    Does the patient have shelter Plus and need 100 day supply (blood pressure, diabetes and cholesterol meds only)? Patient does not have SCP

## 2024-08-29 ENCOUNTER — RESEARCH ENCOUNTER (OUTPATIENT)
Dept: RESEARCH | Facility: MEDICAL CENTER | Age: 59
End: 2024-08-29
Payer: COMMERCIAL

## 2024-08-29 DIAGNOSIS — Z00.6 RESEARCH STUDY PATIENT: ICD-10-CM

## 2024-09-04 ENCOUNTER — APPOINTMENT (OUTPATIENT)
Dept: MEDICAL GROUP | Facility: PHYSICIAN GROUP | Age: 59
End: 2024-09-04
Payer: COMMERCIAL

## 2024-09-04 VITALS
HEIGHT: 63 IN | WEIGHT: 172 LBS | TEMPERATURE: 97.3 F | HEART RATE: 87 BPM | SYSTOLIC BLOOD PRESSURE: 112 MMHG | OXYGEN SATURATION: 98 % | DIASTOLIC BLOOD PRESSURE: 70 MMHG | BODY MASS INDEX: 30.48 KG/M2

## 2024-09-04 DIAGNOSIS — I10 ESSENTIAL HYPERTENSION: Chronic | ICD-10-CM

## 2024-09-04 DIAGNOSIS — E78.00 PURE HYPERCHOLESTEROLEMIA: Chronic | ICD-10-CM

## 2024-09-04 DIAGNOSIS — K21.9 GASTROESOPHAGEAL REFLUX DISEASE WITHOUT ESOPHAGITIS: ICD-10-CM

## 2024-09-04 DIAGNOSIS — R79.89 ELEVATED FERRITIN: ICD-10-CM

## 2024-09-04 DIAGNOSIS — H61.899 LESION OF EAR CANAL: ICD-10-CM

## 2024-09-04 DIAGNOSIS — R74.8 ELEVATED ALKALINE PHOSPHATASE LEVEL: ICD-10-CM

## 2024-09-04 PROCEDURE — 99214 OFFICE O/P EST MOD 30 MIN: CPT | Performed by: STUDENT IN AN ORGANIZED HEALTH CARE EDUCATION/TRAINING PROGRAM

## 2024-09-04 PROCEDURE — 3078F DIAST BP <80 MM HG: CPT | Performed by: STUDENT IN AN ORGANIZED HEALTH CARE EDUCATION/TRAINING PROGRAM

## 2024-09-04 PROCEDURE — 3074F SYST BP LT 130 MM HG: CPT | Performed by: STUDENT IN AN ORGANIZED HEALTH CARE EDUCATION/TRAINING PROGRAM

## 2024-09-04 ASSESSMENT — ENCOUNTER SYMPTOMS
FEVER: 0
CHILLS: 0
DIZZINESS: 0
HEADACHES: 0
SHORTNESS OF BREATH: 0

## 2024-09-04 ASSESSMENT — FIBROSIS 4 INDEX: FIB4 SCORE: 0.94

## 2024-09-04 NOTE — PROGRESS NOTES
Verbal consent was acquired by the patient to use [employer] ambient listening note generation during this visit.    Subjective:     HPI:   History of Present Illness  The patient presents for evaluation of multiple medical concerns.    He reports experiencing intermittent acid reflux and discomfort in his upper abdomen, particularly in the evenings and lasting for about 10 minutes. This issue has been ongoing for several years, with the first episode of heartburn occurring approximately 6 or 7 years ago. He takes Pepcid as needed and he finds that this medication provides relief most of the time. He has not identified any specific food triggers but notes that consuming fruit after meals seems to alleviate his symptoms.     He continues to take lisinopril and amlodipine and reports no side effects. His blood pressure is well-controlled at this time. He is not experiencing any chest pain or shortness of breath.    He occasionally experiences itching in his ears but reports no pain, bleeding, or discharge. He does note the presence of some earwax.        Past medical, surgical, family, and social history were reviewed and updated.     Medications:    Current Outpatient Medications:     lisinopril (PRINIVIL) 20 MG Tab, TAKE 1 TABLET DAILY, Disp: 90 Tablet, Rfl: 3    rosuvastatin (CRESTOR) 10 MG Tab, TAKE 1 TABLET EVERY EVENING, Disp: 90 Tablet, Rfl: 3    amLODIPine (NORVASC) 2.5 MG Tab, TAKE 1 TABLET DAILY, Disp: 90 Tablet, Rfl: 3    Probiotic Product (PROBIOTIC ADVANCED) Cap, Take 1 Cap by mouth every day., Disp: , Rfl:     Allergies:  Lactose and Pollinex-t [modified tree tyrosine adsorbate]      Health Maintenance: Completed    ROS:  Review of Systems   Constitutional:  Negative for chills and fever.   Respiratory:  Negative for shortness of breath.    Cardiovascular:  Negative for chest pain.   Neurological:  Negative for dizziness and headaches.       Objective:     Exam:  /70 (BP Location: Left arm,  "Patient Position: Sitting, BP Cuff Size: Adult)   Pulse 87   Temp 36.3 °C (97.3 °F) (Temporal)   Ht 1.6 m (5' 3\")   Wt 78 kg (172 lb)   SpO2 98%   BMI 30.47 kg/m²  Body mass index is 30.47 kg/m².    Physical Exam  Constitutional:       General: He is not in acute distress.  HENT:      Right Ear: Tympanic membrane, ear canal and external ear normal.      Left Ear: Tympanic membrane normal.      Ears:      Comments: There is a raised, scabbed lesion of the left external auditory canal. No bleeding.     Mouth/Throat:      Mouth: Mucous membranes are moist.   Eyes:      Extraocular Movements: Extraocular movements intact.   Cardiovascular:      Rate and Rhythm: Normal rate and regular rhythm.      Heart sounds: No murmur heard.     No friction rub. No gallop.   Pulmonary:      Effort: Pulmonary effort is normal.      Breath sounds: No wheezing, rhonchi or rales.   Musculoskeletal:      Cervical back: Neck supple.   Skin:     General: Skin is warm and dry.   Neurological:      Mental Status: He is alert.   Psychiatric:         Mood and Affect: Mood normal.         Results      Assessment & Plan:     1. Gastroesophageal reflux disease without esophagitis        2. Essential hypertension        3. Lesion of ear canal  Referral to ENT      4. Pure hypercholesterolemia  Lipid Profile      5. Elevated ferritin  IRON/TOTAL IRON BIND    FERRITIN    CBC WITHOUT DIFFERENTIAL    Comp Metabolic Panel      6. Elevated alkaline phosphatase level  Comp Metabolic Panel          Assessment & Plan  1. Acid Reflux.  Chronic, intermittent problem. Continue Pepcid PRN, okay to take regularly. Dietary modifications were also suggested, such as eating earlier in the day if symptoms worsen at night and avoiding foods that may trigger symptoms, though he has not identified specific triggers.     2. Hypertension.  Chronic, controlled. His blood pressure readings are within the normal range. He is to continue with his current regimen of " lisinopril and amlodipine.     3. Ear lesion.  This is a new finding.  Patient has a small raised skin lesion of the left ear with an overlying scab.  A referral to ENT has been made for further evaluation and possible biopsy if appropriate, if the lesion does not resolve.        Follow-up  The patient will follow up in 6 months for annual.  Complete labs in the next few months.          Please note that this dictation was created using voice recognition software. I have made every reasonable attempt to correct obvious errors, but I expect that there are errors of grammar and possibly content that I did not discover before finalizing the note.

## 2024-09-20 NOTE — RESEARCH NOTE
The applicable research collection order(s) have been created.    Authorized by [employer]    Patient is ready for scheduling.

## 2024-10-07 ENCOUNTER — HOSPITAL ENCOUNTER (OUTPATIENT)
Dept: LAB | Facility: MEDICAL CENTER | Age: 59
End: 2024-10-07
Attending: INTERNAL MEDICINE
Payer: COMMERCIAL

## 2024-10-07 DIAGNOSIS — Z00.6 RESEARCH STUDY PATIENT: ICD-10-CM

## 2024-10-09 LAB
ELF SCORE: 9.51 - (ref 9.8–11.3)
HA (HYALURONIC ACID): 67.57 NG/ML
PIIINP (AMINO-TERMINAL PROPEPTIDE): 7.92 NG/ML
RELATIVE RISK: NORMAL
RISK GROUP: NORMAL
RISK: 3.3 %
TIMP-1 (TISSUE INHIBITOR OF MMP1): 204.9 NG/ML

## 2024-11-03 LAB
APOB+LDLR+PCSK9 GENE MUT ANL BLD/T: NOT DETECTED
BRCA1+BRCA2 DEL+DUP + FULL MUT ANL BLD/T: NOT DETECTED
MLH1+MSH2+MSH6+PMS2 GN DEL+DUP+FUL M: NOT DETECTED

## 2024-11-18 ENCOUNTER — HOSPITAL ENCOUNTER (OUTPATIENT)
Dept: LAB | Facility: MEDICAL CENTER | Age: 59
End: 2024-11-18
Attending: STUDENT IN AN ORGANIZED HEALTH CARE EDUCATION/TRAINING PROGRAM
Payer: COMMERCIAL

## 2024-11-18 DIAGNOSIS — R74.8 ELEVATED ALKALINE PHOSPHATASE LEVEL: ICD-10-CM

## 2024-11-18 DIAGNOSIS — E78.00 PURE HYPERCHOLESTEROLEMIA: Chronic | ICD-10-CM

## 2024-11-18 DIAGNOSIS — R79.89 ELEVATED FERRITIN: ICD-10-CM

## 2024-11-18 LAB
ALBUMIN SERPL BCP-MCNC: 4.2 G/DL (ref 3.2–4.9)
ALBUMIN/GLOB SERPL: 1.6 G/DL
ALP SERPL-CCNC: 103 U/L (ref 30–99)
ALT SERPL-CCNC: 18 U/L (ref 2–50)
ANION GAP SERPL CALC-SCNC: 10 MMOL/L (ref 7–16)
AST SERPL-CCNC: 24 U/L (ref 12–45)
BILIRUB SERPL-MCNC: 0.6 MG/DL (ref 0.1–1.5)
BUN SERPL-MCNC: 9 MG/DL (ref 8–22)
CALCIUM ALBUM COR SERPL-MCNC: 9 MG/DL (ref 8.5–10.5)
CALCIUM SERPL-MCNC: 9.2 MG/DL (ref 8.5–10.5)
CHLORIDE SERPL-SCNC: 105 MMOL/L (ref 96–112)
CHOLEST SERPL-MCNC: 131 MG/DL (ref 100–199)
CO2 SERPL-SCNC: 25 MMOL/L (ref 20–33)
CREAT SERPL-MCNC: 0.82 MG/DL (ref 0.5–1.4)
ERYTHROCYTE [DISTWIDTH] IN BLOOD BY AUTOMATED COUNT: 42.3 FL (ref 35.9–50)
FASTING STATUS PATIENT QL REPORTED: NORMAL
FERRITIN SERPL-MCNC: 408 NG/ML (ref 22–322)
GFR SERPLBLD CREATININE-BSD FMLA CKD-EPI: 100 ML/MIN/1.73 M 2
GLOBULIN SER CALC-MCNC: 2.7 G/DL (ref 1.9–3.5)
GLUCOSE SERPL-MCNC: 120 MG/DL (ref 65–99)
HCT VFR BLD AUTO: 45.7 % (ref 42–52)
HDLC SERPL-MCNC: 37 MG/DL
HGB BLD-MCNC: 16.4 G/DL (ref 14–18)
IRON SATN MFR SERPL: 40 % (ref 15–55)
IRON SERPL-MCNC: 111 UG/DL (ref 50–180)
LDLC SERPL CALC-MCNC: 52 MG/DL
MCH RBC QN AUTO: 31.2 PG (ref 27–33)
MCHC RBC AUTO-ENTMCNC: 35.9 G/DL (ref 32.3–36.5)
MCV RBC AUTO: 86.9 FL (ref 81.4–97.8)
PLATELET # BLD AUTO: 221 K/UL (ref 164–446)
PMV BLD AUTO: 11.4 FL (ref 9–12.9)
POTASSIUM SERPL-SCNC: 3.8 MMOL/L (ref 3.6–5.5)
PROT SERPL-MCNC: 6.9 G/DL (ref 6–8.2)
RBC # BLD AUTO: 5.26 M/UL (ref 4.7–6.1)
SODIUM SERPL-SCNC: 140 MMOL/L (ref 135–145)
TIBC SERPL-MCNC: 275 UG/DL (ref 250–450)
TRIGL SERPL-MCNC: 211 MG/DL (ref 0–149)
UIBC SERPL-MCNC: 164 UG/DL (ref 110–370)
WBC # BLD AUTO: 9.1 K/UL (ref 4.8–10.8)

## 2024-11-18 PROCEDURE — 80061 LIPID PANEL: CPT

## 2024-11-18 PROCEDURE — 83550 IRON BINDING TEST: CPT

## 2024-11-18 PROCEDURE — 85027 COMPLETE CBC AUTOMATED: CPT

## 2024-11-18 PROCEDURE — 36415 COLL VENOUS BLD VENIPUNCTURE: CPT

## 2024-11-18 PROCEDURE — 82728 ASSAY OF FERRITIN: CPT

## 2024-11-18 PROCEDURE — 80053 COMPREHEN METABOLIC PANEL: CPT

## 2024-11-18 PROCEDURE — 83540 ASSAY OF IRON: CPT

## 2025-02-04 ENCOUNTER — TELEPHONE (OUTPATIENT)
Dept: MEDICAL GROUP | Facility: PHYSICIAN GROUP | Age: 60
End: 2025-02-04
Payer: COMMERCIAL

## 2025-02-04 NOTE — TELEPHONE ENCOUNTER
The  patient  came in today to have us scan his new insurance card into [employer] and wants his medications sent to [facility] which is scanned into media. He  is also asking for  a refill of th lisinpril, Amlopidine, and  Rosuvastatin  to be sent to his new pharmacy. Please call pt with any questions

## 2025-02-05 DIAGNOSIS — E78.00 PURE HYPERCHOLESTEROLEMIA: ICD-10-CM

## 2025-02-05 DIAGNOSIS — I10 ESSENTIAL HYPERTENSION: ICD-10-CM

## 2025-02-05 RX ORDER — AMLODIPINE BESYLATE 2.5 MG/1
2.5 TABLET ORAL DAILY
Qty: 90 TABLET | Refills: 3 | Status: SHIPPED | OUTPATIENT
Start: 2025-02-05

## 2025-02-05 RX ORDER — ROSUVASTATIN CALCIUM 10 MG/1
10 TABLET, COATED ORAL EVERY EVENING
Qty: 90 TABLET | Refills: 3 | Status: SHIPPED | OUTPATIENT
Start: 2025-02-05

## 2025-02-05 RX ORDER — LISINOPRIL 20 MG/1
20 TABLET ORAL DAILY
Qty: 90 TABLET | Refills: 3 | Status: SHIPPED | OUTPATIENT
Start: 2025-02-05

## 2025-04-26 SDOH — ECONOMIC STABILITY: INCOME INSECURITY: HOW HARD IS IT FOR YOU TO PAY FOR THE VERY BASICS LIKE FOOD, HOUSING, MEDICAL CARE, AND HEATING?: NOT HARD AT ALL

## 2025-04-26 SDOH — ECONOMIC STABILITY: FOOD INSECURITY: WITHIN THE PAST 12 MONTHS, THE FOOD YOU BOUGHT JUST DIDN'T LAST AND YOU DIDN'T HAVE MONEY TO GET MORE.: NEVER TRUE

## 2025-04-26 SDOH — ECONOMIC STABILITY: FOOD INSECURITY: WITHIN THE PAST 12 MONTHS, YOU WORRIED THAT YOUR FOOD WOULD RUN OUT BEFORE YOU GOT MONEY TO BUY MORE.: NEVER TRUE

## 2025-04-26 SDOH — ECONOMIC STABILITY: INCOME INSECURITY: IN THE LAST 12 MONTHS, WAS THERE A TIME WHEN YOU WERE NOT ABLE TO PAY THE MORTGAGE OR RENT ON TIME?: NO

## 2025-04-26 SDOH — HEALTH STABILITY: PHYSICAL HEALTH

## 2025-04-26 SDOH — HEALTH STABILITY: MENTAL HEALTH

## 2025-04-26 ASSESSMENT — SOCIAL DETERMINANTS OF HEALTH (SDOH)
HOW OFTEN DO YOU GET TOGETHER WITH FRIENDS OR RELATIVES?: ONCE A WEEK
HOW OFTEN DO YOU ATTEND CHURCH OR RELIGIOUS SERVICES?: NEVER
DO YOU BELONG TO ANY CLUBS OR ORGANIZATIONS SUCH AS CHURCH GROUPS UNIONS, FRATERNAL OR ATHLETIC GROUPS, OR SCHOOL GROUPS?: YES
HOW OFTEN DO YOU ATTENT MEETINGS OF THE CLUB OR ORGANIZATION YOU BELONG TO?: MORE THAN 4 TIMES PER YEAR
IN A TYPICAL WEEK, HOW MANY TIMES DO YOU TALK ON THE PHONE WITH FAMILY, FRIENDS, OR NEIGHBORS?: MORE THAN THREE TIMES A WEEK
HOW OFTEN DO YOU ATTENT MEETINGS OF THE CLUB OR ORGANIZATION YOU BELONG TO?: MORE THAN 4 TIMES PER YEAR
HOW OFTEN DO YOU ATTEND CHURCH OR RELIGIOUS SERVICES?: NEVER
WITHIN THE PAST 12 MONTHS, YOU WORRIED THAT YOUR FOOD WOULD RUN OUT BEFORE YOU GOT THE MONEY TO BUY MORE: NEVER TRUE
HOW OFTEN DO YOU HAVE A DRINK CONTAINING ALCOHOL: MONTHLY OR LESS
IN A TYPICAL WEEK, HOW MANY TIMES DO YOU TALK ON THE PHONE WITH FAMILY, FRIENDS, OR NEIGHBORS?: MORE THAN THREE TIMES A WEEK
HOW OFTEN DO YOU GET TOGETHER WITH FRIENDS OR RELATIVES?: ONCE A WEEK
HOW HARD IS IT FOR YOU TO PAY FOR THE VERY BASICS LIKE FOOD, HOUSING, MEDICAL CARE, AND HEATING?: NOT HARD AT ALL
IN THE PAST 12 MONTHS, HAS THE ELECTRIC, GAS, OIL, OR WATER COMPANY THREATENED TO SHUT OFF SERVICE IN YOUR HOME?: NO
DO YOU BELONG TO ANY CLUBS OR ORGANIZATIONS SUCH AS CHURCH GROUPS UNIONS, FRATERNAL OR ATHLETIC GROUPS, OR SCHOOL GROUPS?: YES
HOW OFTEN DO YOU HAVE SIX OR MORE DRINKS ON ONE OCCASION: NEVER
HOW MANY DRINKS CONTAINING ALCOHOL DO YOU HAVE ON A TYPICAL DAY WHEN YOU ARE DRINKING: 1 OR 2

## 2025-04-26 ASSESSMENT — LIFESTYLE VARIABLES
HOW OFTEN DO YOU HAVE SIX OR MORE DRINKS ON ONE OCCASION: NEVER
HOW OFTEN DO YOU HAVE A DRINK CONTAINING ALCOHOL: MONTHLY OR LESS
HOW MANY STANDARD DRINKS CONTAINING ALCOHOL DO YOU HAVE ON A TYPICAL DAY: 1 OR 2
SKIP TO QUESTIONS 9-10: 1
AUDIT-C TOTAL SCORE: 1

## 2025-04-29 ENCOUNTER — OFFICE VISIT (OUTPATIENT)
Dept: MEDICAL GROUP | Facility: PHYSICIAN GROUP | Age: 60
End: 2025-04-29
Payer: COMMERCIAL

## 2025-04-29 VITALS
OXYGEN SATURATION: 98 % | DIASTOLIC BLOOD PRESSURE: 70 MMHG | HEART RATE: 85 BPM | SYSTOLIC BLOOD PRESSURE: 130 MMHG | HEIGHT: 63 IN | WEIGHT: 170 LBS | BODY MASS INDEX: 30.12 KG/M2 | TEMPERATURE: 98.2 F

## 2025-04-29 DIAGNOSIS — I10 ESSENTIAL HYPERTENSION: Chronic | ICD-10-CM

## 2025-04-29 DIAGNOSIS — E78.00 PURE HYPERCHOLESTEROLEMIA: Chronic | ICD-10-CM

## 2025-04-29 DIAGNOSIS — Z12.5 ENCOUNTER FOR SCREENING FOR MALIGNANT NEOPLASM OF PROSTATE: ICD-10-CM

## 2025-04-29 DIAGNOSIS — Z13.1 SCREENING FOR DIABETES MELLITUS: ICD-10-CM

## 2025-04-29 DIAGNOSIS — Z11.4 SCREENING FOR HIV (HUMAN IMMUNODEFICIENCY VIRUS): ICD-10-CM

## 2025-04-29 DIAGNOSIS — Z23 NEED FOR VACCINATION: ICD-10-CM

## 2025-04-29 DIAGNOSIS — Z00.00 WELLNESS EXAMINATION: ICD-10-CM

## 2025-04-29 DIAGNOSIS — R79.89 ELEVATED FERRITIN: ICD-10-CM

## 2025-04-29 ASSESSMENT — FIBROSIS 4 INDEX: FIB4 SCORE: 1.54

## 2025-04-29 ASSESSMENT — PATIENT HEALTH QUESTIONNAIRE - PHQ9: CLINICAL INTERPRETATION OF PHQ2 SCORE: 0

## 2025-04-29 NOTE — PROGRESS NOTES
Subjective:     CC:   Chief Complaint   Patient presents with    Annual Exam       HPI:   Elliott Coon is a 59 y.o. male who presents for an annual exam. He is feeling well and has no complaints.    Health Maintenance  Cholesterol Screenin24   Diabetes Screenin24   AAA Screening: n/a     Cancer screening  Colorectal Cancer Screening: Colonoscopy 9/3/23 - 5 year recall    Prostate Cancer Screening/PSA: ordered     Infectious disease screening/Immunizations  --HIV Screening: ordered   --Hepatitis C Screening: current   --Immunizations:    Influenza: current    HPV:  current    Tetanus: current    Shingles: current   Pneumococcal : current     Hepatitis B: n/a      He  has a past medical history of Anemia (2018), Hemorrhagic disorder (HCC) (2018), High cholesterol, and Hypertension.  He  has a past surgical history that includes hemorrhoidectomy () and hemorrhoidectomy (5/15/2018).  Family History   Problem Relation Age of Onset    Breast Cancer Mother     Prostate cancer Father      Social History     Tobacco Use    Smoking status: Never    Smokeless tobacco: Never   Vaping Use    Vaping status: Never Used   Substance Use Topics    Alcohol use: Yes     Alcohol/week: 0.6 oz     Types: 1 Standard drinks or equivalent per week     Comment: once week    Drug use: No       Patient Active Problem List    Diagnosis Date Noted    Elevated ferritin 2024    Pure hypercholesterolemia 2023    Prediabetes 2023    Essential hypertension 12/15/2022    Hx of hemorrhoids 12/15/2022    Other specified anemias 12/15/2022    FHx: Alzheimer's disease 12/15/2022    Palpitation 12/15/2022    Hemorrhoid prolapse 05/15/2018       Current Outpatient Medications   Medication Sig Dispense Refill    lisinopril (PRINIVIL) 20 MG Tab Take 1 Tablet by mouth every day. 90 Tablet 3    amLODIPine (NORVASC) 2.5 MG Tab Take 1 Tablet by mouth every day. 90 Tablet 3    rosuvastatin (CRESTOR) 10 MG Tab  "Take 1 Tablet by mouth every evening. 90 Tablet 3    Probiotic Product (PROBIOTIC ADVANCED) Cap Take 1 Cap by mouth every day.       No current facility-administered medications for this visit.    (including changes today)  Allergies: Lactose and Pollinex-t [modified tree tyrosine adsorbate]    Review of Systems   Constitutional: Negative for fever, chills and malaise/fatigue.   HENT: Negative for congestion.    Eyes: Negative for pain.   Respiratory: Negative for cough and shortness of breath.    Cardiovascular: Negative for leg swelling.   Gastrointestinal: Negative for nausea, vomiting, abdominal pain and diarrhea.   Genitourinary: Negative for dysuria and hematuria.   Skin: Negative for rash.   Neurological: Negative for dizziness, focal weakness and headaches.   Endo/Heme/Allergies: Does not bleed easily.   Psychiatric/Behavioral: Negative for depression.  The patient is not nervous/anxious.        Objective:     /70 (BP Location: Left arm, Patient Position: Sitting, BP Cuff Size: Adult)   Pulse 85   Temp 36.8 °C (98.2 °F) (Temporal)   Ht 1.6 m (5' 3\")   Wt 77.1 kg (170 lb)   SpO2 98%   BMI 30.11 kg/m²   Body mass index is 30.11 kg/m².  Wt Readings from Last 4 Encounters:   04/29/25 77.1 kg (170 lb)   09/04/24 78 kg (172 lb)   02/29/24 78.7 kg (173 lb 6.4 oz)   11/29/23 78 kg (172 lb)       Physical Exam:  Constitutional: Well-developed and well-nourished. Not diaphoretic. No distress.   Skin: Skin is warm and dry. No rash noted.  Head: Atraumatic without lesions.  Eyes: Conjunctivae and extraocular motions are normal. Pupils are equal, round, and reactive to light. No scleral icterus.   Ears:  External ears unremarkable. Tympanic membranes clear and intact.  Nose: Nares patent. No discharge.   Mouth/Throat: Dentition is good. Oropharynx is clear and moist. Posterior pharynx without erythema or exudates.  Neck: Supple, trachea midline. Normal range of motion. No thyromegaly present. No " lymphadenopathy--cervical or supraclavicular.  Cardiovascular: Regular rate and rhythm, S1 and S2 without murmur, rubs, or gallops.    Lungs: Effort normal. Clear to auscultation throughout. No adventitious sounds. No CVA tenderness.  Abdomen: Soft, non tender, and without distention.  No rebound, guarding, masses or HSM.  Extremities: No cyanosis, clubbing, erythema, nor edema.  Neurological: Alert and oriented x 3. No cranial nerve deficit. Gait normal.  Psychiatric:  Behavior, mood, and affect are appropriate.      Assessment and Plan:     1. Wellness examination        2. Need for vaccination  Pneumococcal Conjugate Vaccine 20-Valent (6 wks+)      3. Elevated ferritin  IRON/TOTAL IRON BIND    FERRITIN      4. Pure hypercholesterolemia  Lipid Profile      5. Essential hypertension  Comp Metabolic Panel      6. Screening for diabetes mellitus  HEMOGLOBIN A1C      7. Encounter for screening for malignant neoplasm of prostate  PROSTATE SPECIFIC AG SCREENING      8. Screening for HIV (human immunodeficiency virus)  HIV AG/AB COMBO ASSAY SCREENING          - Labs per orders.  - Immunizations per orders.     Anticipatory guidance:  Discussed oral hygiene and dental home.  Discussed healthy nutrition.  Encouraged regular physical activity, including cardio and weights.  Encouraged 8 hours of sleep at night.  Discussed sun protection (clothing and sunscreen).  Encouraged use of seat belts, bike helmets.  Feels safe at home and in relationship.      Follow-up: Return in about 6 months (around 10/29/2025) for lab follow up.

## (undated) DEVICE — PACK MINOR BASIN - (2EA/CA)

## (undated) DEVICE — DRAPE LAPAROTOMY T SHEET - (12EA/CA)

## (undated) DEVICE — SODIUM CHL IRRIGATION 0.9% 1000ML (12EA/CA)

## (undated) DEVICE — SUTURE 3-0 CHROMIC GUT SH 27 (36PK/BX)"

## (undated) DEVICE — ELECTRODE DUAL RETURN W/ CORD - (50/PK)

## (undated) DEVICE — HEAD HOLDER JUNIOR/ADULT

## (undated) DEVICE — TUBE E-T HI-LO CUFF 7.0MM (10EA/PK)

## (undated) DEVICE — ELECTRODE 850 FOAM ADHESIVE - HYDROGEL RADIOTRNSPRNT (50/PK)

## (undated) DEVICE — SLEEVE, VASO, THIGH, MED

## (undated) DEVICE — TUBING CLEARLINK DUO-VENT - C-FLO (48EA/CA)

## (undated) DEVICE — PROTECTOR ULNA NERVE - (36PR/CA)

## (undated) DEVICE — SPONGE GAUZESTER 4 X 4 4PLY - (128PK/CA)

## (undated) DEVICE — CANISTER SUCTION 3000ML MECHANICAL FILTER AUTO SHUTOFF MEDI-VAC NONSTERILE LF DISP  (40EA/CA)

## (undated) DEVICE — NEPTUNE 4 PORT MANIFOLD - (20/PK)

## (undated) DEVICE — BOVIE NEEDLE TIP 3CM COLORADO

## (undated) DEVICE — KIT ROOM DECONTAMINATION

## (undated) DEVICE — BRIEF STRETCH MATERNITY M/L - FITS 20-60IN (5EA/BG 20BG/CA)

## (undated) DEVICE — SUTURE GENERAL

## (undated) DEVICE — SENSOR SPO2 NEO LNCS ADHESIVE (20/BX) SEE USER NOTES

## (undated) DEVICE — SET LEADWIRE 5 LEAD BEDSIDE DISPOSABLE ECG (1SET OF 5/EA)

## (undated) DEVICE — SUCTION INSTRUMENT YANKAUER BULBOUS TIP W/O VENT (50EA/CA)

## (undated) DEVICE — GLOVE BIOGEL SZ 8 SURGICAL PF LTX - (50PR/BX 4BX/CA)

## (undated) DEVICE — SET EXTENSION WITH 2 PORTS (48EA/CA) ***PART #2C8610 IS A SUBSTITUTE*****

## (undated) DEVICE — MASK ANESTHESIA ADULT  - (100/CA)

## (undated) DEVICE — GAUZE FLUFF STERILE 2-PLY 36 X 36 (100EA/CA)

## (undated) DEVICE — BLADE SURGICAL #15 - (50/BX 3BX/CA)

## (undated) DEVICE — KIT ANESTHESIA W/CIRCUIT & 3/LT BAG W/FILTER (20EA/CA)

## (undated) DEVICE — GOWN WARMING STANDARD FLEX - (30/CA)

## (undated) DEVICE — HEADREST PRONEVIEW LARGE - (10/CA)

## (undated) DEVICE — LACTATED RINGERS INJ 1000 ML - (14EA/CA 60CA/PF)